# Patient Record
Sex: FEMALE
[De-identification: names, ages, dates, MRNs, and addresses within clinical notes are randomized per-mention and may not be internally consistent; named-entity substitution may affect disease eponyms.]

---

## 2017-06-02 ENCOUNTER — HOSPITAL ENCOUNTER (EMERGENCY)
Dept: HOSPITAL 31 - C.ER | Age: 77
Discharge: LEFT BEFORE DISCHARGE | End: 2017-06-02
Payer: COMMERCIAL

## 2017-06-02 VITALS
RESPIRATION RATE: 14 BRPM | OXYGEN SATURATION: 99 % | TEMPERATURE: 97.8 F | HEART RATE: 64 BPM | DIASTOLIC BLOOD PRESSURE: 64 MMHG | SYSTOLIC BLOOD PRESSURE: 144 MMHG

## 2017-06-02 DIAGNOSIS — R07.89: ICD-10-CM

## 2017-06-02 DIAGNOSIS — R42: Primary | ICD-10-CM

## 2017-06-02 LAB
ALBUMIN/GLOB SERPL: 1.5 {RATIO}
ALP SERPL-CCNC: 55 U/L
ALT SERPL-CCNC: 24 U/L
APTT BLD: 39 SECONDS
AST SERPL-CCNC: 23 U/L
BASOPHILS # BLD AUTO: 0 K/UL
BASOPHILS NFR BLD: 0.6 %
BILIRUB SERPL-MCNC: 0.5 MG/DL
BILIRUB UR-MCNC: NEGATIVE MG/DL
BUN SERPL-MCNC: 24 MG/DL
CALCIUM SERPL-MCNC: 8.8 MG/DL
CHLORIDE SERPL-SCNC: 101 MMOL/L
CHOLEST SERPL-MCNC: 207 MG/DL
CO2 SERPL-SCNC: 30 MMOL/L
EOSINOPHIL # BLD AUTO: 0.2 K/UL
EOSINOPHIL NFR BLD: 3.5 %
ERYTHROCYTE [DISTWIDTH] IN BLOOD BY AUTOMATED COUNT: 14.8 %
GLOBULIN SER-MCNC: 2.8 GM/DL
GLUCOSE SERPL-MCNC: 92 MG/DL
GLUCOSE UR STRIP-MCNC: NORMAL MG/DL
HCT VFR BLD CALC: 42 %
INR PPP: 1.2
KETONES UR STRIP-MCNC: NEGATIVE MG/DL
LEUKOCYTE ESTERASE UR-ACNC: (no result) LEU/UL
LYMPHOCYTES # BLD AUTO: 2.2 K/UL
LYMPHOCYTES NFR BLD AUTO: 32.1 %
MCH RBC QN AUTO: 30.2 PG
MCHC RBC AUTO-ENTMCNC: 33.1 G/DL
MCV RBC AUTO: 91.1 FL
MONOCYTES # BLD: 0.8 K/UL
MONOCYTES NFR BLD: 12.1 %
NRBC BLD AUTO-RTO: 0.1 %
PH UR STRIP: 5 [PH]
PLATELET # BLD: 263 K/UL
PMV BLD AUTO: 8.4 FL
POTASSIUM SERPL-SCNC: 4.4 MMOL/L
PROT SERPL-MCNC: 7 G/DL
PROT UR STRIP-MCNC: NEGATIVE MG/DL
RBC # UR STRIP: NEGATIVE /UL
RBC #/AREA URNS HPF: < 1 /HPF
SODIUM SERPL-SCNC: 139 MMOL/L
SP GR UR STRIP: 1.02
UROBILINOGEN UR-MCNC: NORMAL MG/DL
WBC # BLD AUTO: 6.7 K/UL
WBC #/AREA URNS HPF: 2 /HPF

## 2017-06-02 PROCEDURE — 71010: CPT

## 2017-06-02 PROCEDURE — 80061 LIPID PANEL: CPT

## 2017-06-02 PROCEDURE — 85730 THROMBOPLASTIN TIME PARTIAL: CPT

## 2017-06-02 PROCEDURE — 83036 HEMOGLOBIN GLYCOSYLATED A1C: CPT

## 2017-06-02 PROCEDURE — 85610 PROTHROMBIN TIME: CPT

## 2017-06-02 PROCEDURE — 82948 REAGENT STRIP/BLOOD GLUCOSE: CPT

## 2017-06-02 PROCEDURE — 81001 URINALYSIS AUTO W/SCOPE: CPT

## 2017-06-02 PROCEDURE — 80053 COMPREHEN METABOLIC PANEL: CPT

## 2017-06-02 PROCEDURE — 96374 THER/PROPH/DIAG INJ IV PUSH: CPT

## 2017-06-02 PROCEDURE — 70450 CT HEAD/BRAIN W/O DYE: CPT

## 2017-06-02 PROCEDURE — 99285 EMERGENCY DEPT VISIT HI MDM: CPT

## 2017-06-02 PROCEDURE — 85025 COMPLETE CBC W/AUTO DIFF WBC: CPT

## 2017-06-02 PROCEDURE — 93005 ELECTROCARDIOGRAM TRACING: CPT

## 2017-06-02 PROCEDURE — 84484 ASSAY OF TROPONIN QUANT: CPT

## 2017-06-02 PROCEDURE — 96375 TX/PRO/DX INJ NEW DRUG ADDON: CPT

## 2017-06-02 NOTE — C.PDOC
Time Seen by Provider: 06/02/17 17:07


Chief Complaint (Nursing): Dizziness/Lightheaded





Past Medical History


Vital Signs: 





 Last Vital Signs











Temp  98.6 F   06/02/17 17:05


 


Pulse  70   06/02/17 17:05


 


Resp  20   06/02/17 17:05


 


BP  138/69   06/02/17 17:05


 


Pulse Ox  96   06/02/17 17:05














- Medical History


PMH: Atrial Fibrillation, Cardia Arrhythmia (unknown tachycardia), HTN, 

Pneumonia


Family History: States: Unknown Family Hx





- Social History


Hx Alcohol Use: No


Hx Substance Use: No





ED Course And Treatment





- Laboratory Results


Result Diagrams: 


 06/02/17 17:42





 06/02/17 17:42


O2 Sat by Pulse Oximetry: 96

## 2017-06-02 NOTE — CT
PROCEDURE:  CT HEAD WITHOUT CONTRAST.



HISTORY:

dizzy x 5 days



COMPARISON:

None available. 



TECHNIQUE:

Axial computed tomography images were obtained through the head/brain 

without intravenous contrast.  



Radiation dose:



Total exam DLP = 1703.67 mGy-cm.



This CT exam was performed using one or more of the following dose 

reduction techniques: Automated exposure control, adjustment of the 

mA and/or kV according to patient size, and/or use of iterative 

reconstruction technique.



FINDINGS:



HEMORRHAGE:

No intracranial hemorrhage. 



BRAIN:

No mass effect or edema.  Mild atrophy and mild-to-moderate white 

matter changes.



VENTRICLES:

Unremarkable. No hydrocephalus. 



CALVARIUM:

Unremarkable.



PARANASAL SINUSES:

Unremarkable as visualized. No significant inflammatory changes.



MASTOID AIR CELLS:

Unremarkable as visualized. No inflammatory changes.



OTHER FINDINGS:

None.



IMPRESSION:

Limited study due to patient's motion.  No evidence of acute 

intracranial hemorrhage.



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

## 2017-06-02 NOTE — CP.PCM.HP
History of Present Illness





- History of Present Illness


History of Present Illness: 





CC - "Headache x 5 days"





HPI - 76 year old female with a past medical history Afib, cataracts, 

osteoporosis, hypertension present today complaining of dizziness and headache 

for 5 days. She states this is on and off. She denies acute visual changes but 

states she has bad vision due to cataracts and was evaluation by an 

ophthalmologist recently. She said she feeling tired and admits to shortness of 

breath with exertion such as walking a few blocks or going up stairs. She 

denies swelling in the legs. Per daughter she has had a nonproductive cough 

today. She admits to generalized weakness. 





She denies recent illlness, sick contacts, fever, chills, chest pain, 

palpitations, shortness of breath, abd pain, vomiting, diarrhea/constipation, 

numbness or tingling. 








PMHx - Afib, cataracts, osteoporosis, hypertension


Surg - denies


Fam Hx - sister has vaginal cancer, ho hx of stroke, heart disease, MI, DM


Meds - Xarelto 20mg PO daily. Metorpolol 25 mg PO daily, Bisphophenate weekly 

on Fridays


Allergies - NKDA


Social - denies tobacco, alcohol, or drug use. From the [place]. 

Lives here with her daughter for about 6 months out of the year





PMD - none but has been seen at [facility] about 3 months ago


Cardiologist - none but has been evaluated before in the past and can't 

remember the name





Review of Systems





- Constitutional


Constitutional: absent: Chills, Fever





- EENT


Eyes: absent: Blurred Vision, Change in Vision (cataracts)





- Cardiovascular


Cardiovascular: Lightheadedness, Orthopnea.  absent: Chest Pain, Chest Pain at 

Rest, Leg Edema, Palpitations, Syncope





- Respiratory


Respiratory: Cough (nonproductive), Dyspnea on Exertion.  absent: Dyspnea





- Gastrointestinal


Gastrointestinal: absent: Abdominal Pain, Constipation, Diarrhea, Nausea, 

Vomiting





- Genitourinary


Genitourinary: absent: Change in Urinary Stream, Difficulty Urinating





- Musculoskeletal


Musculoskeletal: absent: Numbness, Tingling


Additional comments: 





R knee pain, chronic pains from arthritis





- Neurological


Neurological: Dizziness, Headaches.  absent: Numbness, Syncope, Tingling, 

Weakness





Past Patient History





- Infectious Disease


Hx of Infectious Diseases: None





- Past Social History


Smoking Status: Never Smoked





- CARDIAC


Hx Atrial Fibrillation: Yes


Hx Cardia Arrhythmia: Yes (unknown tachycardia)


Hx Hypertension: Yes





- PULMONARY


Hx Pneumonia: Yes





- PSYCHIATRIC


Hx Substance Use: No





- SURGICAL HISTORY


Hx Surgeries: No





Meds


Allergies/Adverse Reactions: 


 Allergies











Allergy/AdvReac Type Severity Reaction Status Date / Time


 


No Known Allergies Allergy   Verified 06/02/17 17:25














Results





- Vital Signs


Recent Vital Signs: 





 Last Vital Signs











Temp  98.6 F   06/02/17 17:05


 


Pulse  70   06/02/17 17:05


 


Resp  20   06/02/17 17:05


 


BP  138/69   06/02/17 17:05


 


Pulse Ox  96   06/02/17 19:43














- Labs


Result Diagrams: 


 06/02/17 17:42





 06/02/17 17:42





Assessment & Plan





- Assessment and Plan (Free Text)


Assessment: 





76 year old female with a past medical history of Afib, HTN, osteoporosis, and 

arthritis presents for dizziness and HA for 5 days:


Plan: 








Atrial fibrillation


Denies palpitations or chest pain on examination but per ED complained of chest 

discomfort


NSR on Monitor


Troponin negative, will follow up with serial troponins and EKG


EKG - NSR at 66 bpm


HbA1c 5.9


Tchol 207, Trig 123, , HDL 37


f/u Echo


f/u orthostatic vitals


Xarelto 20mg PO - takes in the mornings


Admit to telemetry


f/u am labs, TSH Free T4





Hypertension


controlled 


monitor 


Home med: Metoprolol 25 mg PO daily


Will hold as patient is dizzy and HR is in the low 60s





Osteoporosis


Tylenol prn pain


Takes bisphosphenate once weekly on Friday mornings - took dose today 





Prophylactic measures


GI - not indicated


DVT - on Xarelto daily


Heart healthy diet

## 2017-06-02 NOTE — C.PDOC
History Of Present Illness





77 y/o female presents to the emergency department with complains of dizziness 

for the past 5 days, worse with climbing stairs. Pt denies headache, chest pain

, fever, vomiting, SOB or any other complaints. 


Time Seen by Provider: 17 17:07


Chief Complaint (Nursing): Dizziness/Lightheaded


History Per: Patient


History/Exam Limitations: no limitations


Onset/Duration Of Symptoms: Days


Current Symptoms Are (Timing): Still Present


Fall Associated With With Symptoms: No


Severity: Mild


Recent travel outside of the [place]: No





- Symptoms Of CVA


Recent Head Trauma: No





Past Medical History


Reviewed: Historical Data, Nursing Documentation, Vital Signs


Vital Signs: 


 Last Vital Signs











Temp  98.6 F   17 17:05


 


Pulse  62   17 20:49


 


Resp  15   17 20:49


 


BP  146/54 L  17 20:49


 


Pulse Ox  96   17 21:58














- Medical History


PMH: Atrial Fibrillation, Cardia Arrhythmia (unknown tachycardia), HTN, 

Pneumonia


Family History: States: Unknown Family Hx





- Social History


Hx Alcohol Use: No


Hx Substance Use: No





Review Of Systems


Except As Marked, All Systems Reviewed And Found Negative.


Constitutional: Negative for: Fever, Chills


Cardiovascular: Negative for: Chest Pain


Respiratory: Negative for: Shortness of Breath


Gastrointestinal: Negative for: Vomiting


Neurological: Positive for: Dizziness.  Negative for: Headache





Physical Exam





- Physical Exam


Appears: Non-toxic, No Acute Distress, Other (obese)


Skin: Warm, Dry, No Rash


Head: Atraumatic, Normacephalic


Eye(s): bilateral: Normal Inspection, PERRL, EOMI


Neck: Normal, Normal ROM, Supple


Chest: Symmetrical, No Tenderness


Cardiovascular: Rhythm Regular, No Murmur


Respiratory: Normal Breath Sounds, No Rales, No Rhonchi, No Wheezing


Gastrointestinal/Abdominal: Normal Exam, Soft, No Tenderness


Extremity: Normal ROM


Extremity: Bilateral: Atraumatic


Neurological/Psych: Oriented x3, Normal Speech, Normal Cognition





ED Course And Treatment





- Laboratory Results


Result Diagrams: 


 17 17:42





 17 17:42


Lab Interpretation: Normal (trop neg.)


ECG: Interpreted By Me


ECG Rhythm: Sinus Rhythm


ECG Interpretation: Normal


Rate From EC


O2 Sat by Pulse Oximetry: 96 (room air)


Pulse Ox Interpretation: Normal





- Radiology


CXR: Interpreted by Me


CXR Interpretation: Yes: No Acute Disease, Other (? mild CHF vs body habitus.)





- CT Scan/US


  ** CT head


Other Rad Studies (CT/US): Read By Radiologist, Radiology Report Reviewed


CT/US Interpretation: IMPRESSION:  Limited study due to patient's motion.  No 

evidence of acute intracranial hemorrhage.  Atrophy and chronic microvascular 

white matter ischemic disease.


Progress Note: Plan: EKG, CT head, labs, CXR, meclizine, pepcid, zofran, BGL


Reevaluation Time: 19:27


Reassessment Condition: Improved





- Physician Consult Information


Outcome Of Conversation: 193: d/w [facility] Dr. Katz ok to Tele obs.





Medical Decision Making


Medical Decision Making: 





dizziness, sob, CARRIZALES may be cardiac or neuro in nature.


w/u neg to this point.


consider Cardiac and Neuro evals.





: evaled by Medicine- defer adm now and opt f/u to be arranged.





Disposition


Doctor Will See Patient In The: Hospital


Counseled Patient/Family Regarding: Studies Performed, Diagnosis





- Disposition


Disposition: HOME/ ROUTINE


Disposition Time: 19:28


Condition: GOOD





- Clinical Impression


Clinical Impression: 


 Chest discomfort, Dizzinesses








- Scribe Statement


The provider has reviewed the documentation as recorded by the Pooja Cruz


Provider Attestation: 








All medical record entries made by the Pooja were at my direction and 

personally dictated by me. I have reviewed the chart and agree that the record 

accurately reflects my personal performance of the history, physical exam, 

medical decision making, and the department course for this patient. I have 

also personally directed, reviewed, and agree with the discharge instructions 

and disposition.

## 2017-06-03 NOTE — RAD
HISTORY:

dizzy  



COMPARISON:

No prior. 



FINDINGS:



LUNGS:

Poor inspiration with low lung volumes and crowded bronchovascular 

markings and minor bibasilar atelectasis right greater than left.  

Right lower lobe infiltrate not excluded therefore followup 

radiographs recommended 



PLEURA:

No significant pleural effusion identified, no pneumothorax apparent.



CARDIOVASCULAR:

Heart is borderline/mildly enlarged 



OSSEOUS STRUCTURES:

No significant abnormalities.



VISUALIZED UPPER ABDOMEN:

Normal.



OTHER FINDINGS:

None.



IMPRESSION:

Poor inspiration with low lung volumes and crowded bronchovascular 

markings and minor bibasilar atelectasis right greater than left.  

Right lower lobe infiltrate could be excluded with followup 

radiographs. . 



This report was placed folder in PA review for followup.

## 2017-06-05 NOTE — CARD
--------------- APPROVED REPORT --------------





EKG Measurement

Heart [id number]

NM [id number]

[id number]

[id number]

[id number]



<Conclusion>

Normal sinus rhythm

Normal ECG

## 2017-06-07 ENCOUNTER — HOSPITAL ENCOUNTER (EMERGENCY)
Dept: HOSPITAL 31 - C.ER | Age: 77
Discharge: HOME | End: 2017-06-07
Payer: COMMERCIAL

## 2017-06-07 VITALS
OXYGEN SATURATION: 100 % | RESPIRATION RATE: 18 BRPM | SYSTOLIC BLOOD PRESSURE: 120 MMHG | HEART RATE: 74 BPM | TEMPERATURE: 98.2 F | DIASTOLIC BLOOD PRESSURE: 76 MMHG

## 2017-06-07 DIAGNOSIS — Z00.00: Primary | ICD-10-CM

## 2017-06-07 NOTE — C.PDOC
History Of Present Illness





77 y/o female presents to ED who was seen and evaluated in the ER last Friday (

5 days ago) for SOB, dizziness, lightheadedness, nausea - full workup including 

CXR, discharged home with viral syndrome dx. Since then, patient reports 

symptoms have resolved, and that she feels much better. However, patient 

reports she got a phone call today informing her that the chest x-ray could not 

rule out RLL infiltrate. She denies any physical complaints on arrival; denies 

cough, congestion, fever, chills, or other associated symptoms. 


Time Seen by Provider: 06/07/17 15:15


Chief Complaint (Nursing): Abnormal Labs


History Per: Patient


History/Exam Limitations: no limitations


Current Symptoms Are (Timing): Gone


Reports Recently: Seen In ED


Recent travel outside of the [place]: No





Past Medical History


Reviewed: Historical Data, Nursing Documentation, Vital Signs


Vital Signs: 


 Last Vital Signs











Temp  98.2 F   06/07/17 14:58


 


Pulse  74   06/07/17 14:58


 


Resp  18   06/07/17 14:58


 


BP  120/76   06/07/17 14:58


 


Pulse Ox  100   06/07/17 15:26














- Medical History


PMH: Atrial Fibrillation, Cardia Arrhythmia (unknown tachycardia), HTN, 

Pneumonia


Family History: States: Unknown Family Hx





- Social History


Hx Alcohol Use: No


Hx Substance Use: No





Review Of Systems


Constitutional: Negative for: Fever, Chills


ENT: Negative for: Nose Congestion


Cardiovascular: Negative for: Chest Pain


Respiratory: Negative for: Cough, Shortness of Breath, Wheezing


Gastrointestinal: Negative for: Nausea, Vomiting, Abdominal Pain


Skin: Negative for: Rash


Neurological: Negative for: Headache, Dizziness





Physical Exam





- Physical Exam


Appears: Non-toxic, No Acute Distress


Skin: Normal Color, Warm, Dry


Head: Atraumatic, Normacephalic


Oral Mucosa: Moist


Chest: Symmetrical


Cardiovascular: Rhythm Regular


Respiratory: Normal Breath Sounds, No Rales, No Rhonchi, No Wheezing


Gastrointestinal/Abdominal: Soft, No Tenderness, No Guarding, No Rebound


Back: Normal Inspection


Extremity: Normal ROM, Capillary Refill (< 2 sec. )


Neurological/Psych: Oriented x3, Normal Speech, Normal Cognition





ED Course And Treatment


O2 Sat by Pulse Oximetry: 100 (RA)


Pulse Ox Interpretation: Normal





- Radiology


CXR: Viewed By Me, Read By Radiologist


CXR Interpretation: Yes: No Acute Disease





Disposition





- Disposition


Disposition: HOME/ ROUTINE


Disposition Time: 16:01


Condition: IMPROVED


Instructions:  Normal Exam (ED)





- Clinical Impression


Clinical Impression: 


 Normal exam








- Scribe Statement


The provider has reviewed the documentation as recorded by the Pooja Samayoa


Provider Attestation: 








All medical record entries made by the Pooja were at my direction and 

personally dictated by me. I have reviewed the chart and agree that the record 

accurately reflects my personal performance of the history, physical exam, 

medical decision making, and the department course for this patient. I have 

also personally directed, reviewed, and agree with the discharge instructions 

and disposition.

## 2017-06-07 NOTE — RAD
HISTORY:

follow up CXR r/o RLL infiltrate  



COMPARISON:

6/2/2017



TECHNIQUE:

Chest PA and lateral



FINDINGS:



LUNGS:

No active pulmonary disease.



PLEURA:

No significant pleural effusion identified. No pneumothorax apparent.



CARDIOVASCULAR:

Normal.



OSSEOUS STRUCTURES:

No significant abnormalities.



VISUALIZED UPPER ABDOMEN:

Normal.



OTHER FINDINGS:

None.



IMPRESSION:

No active disease.

## 2017-07-02 ENCOUNTER — HOSPITAL ENCOUNTER (OUTPATIENT)
Dept: HOSPITAL 31 - C.ER | Age: 77
Setting detail: OBSERVATION
LOS: 1 days | Discharge: HOME | End: 2017-07-03
Attending: INTERNAL MEDICINE | Admitting: INTERNAL MEDICINE
Payer: SELF-PAY

## 2017-07-02 VITALS — HEART RATE: 116 BPM

## 2017-07-02 VITALS — BODY MASS INDEX: 33.3 KG/M2

## 2017-07-02 DIAGNOSIS — I48.0: Primary | ICD-10-CM

## 2017-07-02 DIAGNOSIS — Z87.01: ICD-10-CM

## 2017-07-02 DIAGNOSIS — I10: ICD-10-CM

## 2017-07-02 LAB
ALBUMIN/GLOB SERPL: 1.1 {RATIO} (ref 1–2.1)
ALP SERPL-CCNC: 54 U/L (ref 38–126)
ALT SERPL-CCNC: 26 U/L (ref 9–52)
APTT BLD: 43 SECONDS (ref 21–34)
AST SERPL-CCNC: 27 U/L (ref 14–36)
BASOPHILS # BLD AUTO: 0 K/UL (ref 0–0.2)
BASOPHILS NFR BLD: 0.5 % (ref 0–2)
BILIRUB SERPL-MCNC: 0.4 MG/DL (ref 0.2–1.3)
BILIRUB UR-MCNC: NEGATIVE MG/DL
BUN SERPL-MCNC: 23 MG/DL (ref 7–17)
CALCIUM SERPL-MCNC: 9.8 MG/DL (ref 8.6–10.4)
CHLORIDE SERPL-SCNC: 106 MMOL/L (ref 98–107)
CHOLEST SERPL-MCNC: 151 MG/DL (ref 0–199)
CHOLEST SERPL-MCNC: 182 MG/DL (ref 0–199)
CO2 SERPL-SCNC: 30 MMOL/L (ref 22–30)
EOSINOPHIL # BLD AUTO: 0.2 K/UL (ref 0–0.7)
EOSINOPHIL NFR BLD: 2.3 % (ref 0–4)
ERYTHROCYTE [DISTWIDTH] IN BLOOD BY AUTOMATED COUNT: 14.6 % (ref 11.5–14.5)
GLOBULIN SER-MCNC: 3.5 GM/DL (ref 2.2–3.9)
GLUCOSE SERPL-MCNC: 116 MG/DL (ref 65–105)
GLUCOSE UR STRIP-MCNC: NORMAL MG/DL
HCT VFR BLD CALC: 45.7 % (ref 34–47)
INR PPP: 1.5
KETONES UR STRIP-MCNC: NEGATIVE MG/DL
LEUKOCYTE ESTERASE UR-ACNC: (no result) LEU/UL
LYMPHOCYTES # BLD AUTO: 2.1 K/UL (ref 1–4.3)
LYMPHOCYTES NFR BLD AUTO: 26.5 % (ref 20–40)
MCH RBC QN AUTO: 30.2 PG (ref 27–31)
MCHC RBC AUTO-ENTMCNC: 32.7 G/DL (ref 33–37)
MCV RBC AUTO: 92.1 FL (ref 81–99)
MONOCYTES # BLD: 0.9 K/UL (ref 0–0.8)
MONOCYTES NFR BLD: 10.9 % (ref 0–10)
NRBC BLD AUTO-RTO: 0 % (ref 0–2)
PH UR STRIP: 5 [PH] (ref 5–8)
PLATELET # BLD: 286 K/UL (ref 130–400)
PMV BLD AUTO: 8.8 FL (ref 7.2–11.7)
POTASSIUM SERPL-SCNC: 4.4 MMOL/L (ref 3.6–5.2)
PROT SERPL-MCNC: 7.4 G/DL (ref 6.3–8.3)
PROT UR STRIP-MCNC: NEGATIVE MG/DL
RBC # UR STRIP: NEGATIVE /UL
RBC #/AREA URNS HPF: 2 /HPF (ref 0–3)
SODIUM SERPL-SCNC: 147 MMOL/L (ref 132–148)
SP GR UR STRIP: 1.02 (ref 1–1.03)
T3RU NFR SERPL: 36 % (ref 23–41)
T4 SERPL-MCNC: 7.7 UG/DL (ref 5.5–11)
TSH SERPL-ACNC: 0.61 MIU/L (ref 0.46–4.68)
UROBILINOGEN UR-MCNC: NORMAL MG/DL (ref 0.2–1)
WBC # BLD AUTO: 8 K/UL (ref 4.8–10.8)
WBC #/AREA URNS HPF: 5 /HPF (ref 0–5)

## 2017-07-02 PROCEDURE — 96374 THER/PROPH/DIAG INJ IV PUSH: CPT

## 2017-07-02 PROCEDURE — 85025 COMPLETE CBC W/AUTO DIFF WBC: CPT

## 2017-07-02 PROCEDURE — 85730 THROMBOPLASTIN TIME PARTIAL: CPT

## 2017-07-02 PROCEDURE — 97162 PT EVAL MOD COMPLEX 30 MIN: CPT

## 2017-07-02 PROCEDURE — 93005 ELECTROCARDIOGRAM TRACING: CPT

## 2017-07-02 PROCEDURE — 71010: CPT

## 2017-07-02 PROCEDURE — 84479 ASSAY OF THYROID (T3 OR T4): CPT

## 2017-07-02 PROCEDURE — 97116 GAIT TRAINING THERAPY: CPT

## 2017-07-02 PROCEDURE — 99285 EMERGENCY DEPT VISIT HI MDM: CPT

## 2017-07-02 PROCEDURE — 80053 COMPREHEN METABOLIC PANEL: CPT

## 2017-07-02 PROCEDURE — 84443 ASSAY THYROID STIM HORMONE: CPT

## 2017-07-02 PROCEDURE — 97165 OT EVAL LOW COMPLEX 30 MIN: CPT

## 2017-07-02 PROCEDURE — 81001 URINALYSIS AUTO W/SCOPE: CPT

## 2017-07-02 PROCEDURE — 85610 PROTHROMBIN TIME: CPT

## 2017-07-02 PROCEDURE — 80061 LIPID PANEL: CPT

## 2017-07-02 PROCEDURE — 84484 ASSAY OF TROPONIN QUANT: CPT

## 2017-07-02 PROCEDURE — 96375 TX/PRO/DX INJ NEW DRUG ADDON: CPT

## 2017-07-02 PROCEDURE — 97530 THERAPEUTIC ACTIVITIES: CPT

## 2017-07-02 PROCEDURE — 84436 ASSAY OF TOTAL THYROXINE: CPT

## 2017-07-02 PROCEDURE — 83880 ASSAY OF NATRIURETIC PEPTIDE: CPT

## 2017-07-02 NOTE — RAD
HISTORY:

atrial fib  



COMPARISON:

06/07/2017 



FINDINGS:



LUNGS:

There is mild pulmonary venous congestion. No focal consolidation



PLEURA:

No significant pleural effusion identified, no pneumothorax apparent.



CARDIOVASCULAR:

There is persistent mild cardio



OSSEOUS STRUCTURES:

No significant abnormalities.



VISUALIZED UPPER ABDOMEN:

Normal.



OTHER FINDINGS:

None.



IMPRESSION:

No acute findings.  Mild pulmonary venous congestion and mild 

cardiomegaly.

## 2017-07-02 NOTE — C.PDOC
History Of Present Illness


Patient is a 77 y/o female, whose PMHx includes Afib, that presents to the ED 

for evaluation of palpitations since last night. Patient states that she woke 

up this morning feeling weak, and lightheaded. Patient also reports shortness 

of breath with excertion, but denies any chest pain. Patient states she is 

compliant with her Xarelto, and Metoprolol 25mg once a day. Otherwise, denies 

any fever, chills, cough, swelling, abdominal pain, nausea, vomiting, or any 

other associated symptoms at this time.





Time Seen by Provider: 07/02/17 12:15


Chief Complaint (Nursing): Palpitations


History Per: Patient


History/Exam Limitations: no limitations


Onset/Duration Of Symptoms: Days (1)


Current Symptoms Are (Timing): Still Present


Associated Symptoms: denies: Chest Pain, Blurred Vision, Focal Weakness, 

Headache


Recent travel outside of the [place]: No


Additional History Per: Patient





Past Medical History


Reviewed: Historical Data, Nursing Documentation, Vital Signs


Vital Signs: 


 Last Vital Signs











Temp  98.2 F   07/02/17 12:19


 


Pulse  118 H  07/02/17 12:55


 


Resp  12   07/02/17 12:55


 


BP  115/58 L  07/02/17 12:55


 


Pulse Ox  97   07/02/17 13:13














- Medical History


PMH: Atrial Fibrillation, Cardia Arrhythmia (unknown tachycardia), HTN, 

Pneumonia


Family History: States: Unknown Family Hx





- Social History


Hx Alcohol Use: No


Hx Substance Use: No





- Immunization History


Hx Tetanus Toxoid Vaccination: No


Hx Influenza Vaccination: No


Hx Pneumococcal Vaccination: No





Review Of Systems


Except As Marked, All Systems Reviewed And Found Negative.


Constitutional: Positive for: Weakness.  Negative for: Fever, Chills


Cardiovascular: Positive for: Palpitations, Light Headedness.  Negative for: 

Chest Pain


Respiratory: Positive for: SOB with Excertion.  Negative for: Cough


Gastrointestinal: Negative for: Nausea, Vomiting, Abdominal Pain


Neurological: Negative for: Headache, Dizziness





Physical Exam





- Physical Exam


Appears: Non-toxic, No Acute Distress


Skin: Normal Color, Warm, Dry


Head: Atraumatic, Normacephalic


Eye(s): bilateral: Normal Inspection, EOMI


Neck: Normal ROM, Supple


Chest: Symmetrical, No Tenderness


Cardiovascular: Rhythm Irregular (irregularly irregular)


Respiratory: Normal Breath Sounds, No Rales, No Rhonchi, No Wheezing


Gastrointestinal/Abdominal: Soft, No Tenderness, Other (obese abdomen)


Extremity: Normal ROM, No Pedal Edema, Capillary Refill (<2 sec.), No Deformity

, No Swelling


Neurological/Psych: Oriented x3, Normal Speech, Normal Cognition





ED Course And Treatment





- Laboratory Results


Result Diagrams: 


 07/02/17 12:30





 07/02/17 12:30


Lab Interpretation: Abnormal


Interpretation Of Abnormal: BUN 23, BNP 2650, Troponnin normal, TSH normal.


ECG: Interpreted By Me


ECG Rhythm: Atrial Fibrillation (with rapid ventricular responseQ wave V1 c/w 

possible old anterior infarct.)


O2 Sat by Pulse Oximetry: 97 (on RA)


Pulse Ox Interpretation: Normal





- Radiology


CXR: Interpreted by Me


CXR Interpretation: Yes: Cardiomegaly (with vascular congestion)


Progress Note: Labs, EKG ordered and reviewed. Patient was given IV Cardizem in 

the ER.


Reevaluation Time: 13:11


Reassessment Condition: Improved (Heart rate decresed after Cardizem 10mg IVP. 

BP initially decresed to 86/50 but improved with normal saline bolus.)





- Physician Consult Information


Time Consulting Physician Contacted: 13:12


Physician Contacted: Vaibhav Edwards


Outcome Of Conversation: Patient to be admitted for rapid atrial fibrillation.





Disposition





- Disposition


Disposition: HOSPITALIZED


Disposition Time: 13:12


Condition: IMPROVED





- POA


Present On Arrival: None





- Clinical Impression


Clinical Impression: 


 Paroxysmal atrial fibrillation with rapid ventricular response








- Scribe Statement


The provider has reviewed the documentation as recorded by the Pooja Tena





Provider Attestation: 








All medical record entries made by the Mohsenibava were at my direction and 

personally dictated by me. I have reviewed the chart and agree that the record 

accurately reflects my personal performance of the history, physical exam, 

medical decision making, and the department course for this patient. I have 

also personally directed, reviewed, and agree with the discharge instructions 

and disposition.

## 2017-07-02 NOTE — CP.PCM.HP
History of Present Illness





- History of Present Illness


History of Present Illness: 





CC: Dizziness, Light headedness, SOB with exertion





Patient is a 77 y/o female, whose PMHx includes Afib, that presents to the ED 

for evaluation of palpitations since last night. Patient states that she woke 

up this morning feeling weak, and lightheaded. Patient also reports shortness 

of breath with excertion, but denies any chest pain. Patient states she is 

compliant with her Xarelto, and Metoprolol 25mg once a day. Otherwise, denies 

any fever, chills, cough, swelling, abdominal pain, nausea, vomiting, or any 

other associated symptoms at this time.pt was in rapid A.fib when i saw in ER , 

hypotensive, was given fluid blous, initially was given cardizem then digoxin 

and pt responded





Present on Admission





- Present on Admission


Any Indicators Present on Admission: Yes


History of DVT/PE: No


History of Uncontrolled Diabetes: No





Review of Systems





- Review of Systems


Systems not reviewed;Unavailable: Acuity of Condition





- Constitutional


Constitutional: Fatigue, Weakness





- EENT


Eyes: absent: As Per HPI, Blind Spots, Blurred Vision, Change in Vision, 

Decreased Night Vision, Diplopia, Discharge, Dry Eye, Exophthalmos, Floaters, 

Irritation, Itchy Eyes, Loss of Peripheral Vision, Pain, Photophobia, Requires 

Corrective Lenses, Sees Flashes, Spots in Vision, Tunnel Vision, Other Visual 

Disturbances, Loss of Vision, Other





- Cardiovascular


Cardiovascular: Dyspnea, Dyspnea on Exertion, Lightheadedness





- Respiratory


Respiratory: absent: As Per HPI, Cough, Dyspnea, Hemoptysis, Dyspnea on Exertion

, Wheezing, Snoring, Stridor, Pain on Inspiration, Chest Congestion, Excessive 

Mucous Production, Change in Mucous Color, Pain with Coughing, Other





- Gastrointestinal


Gastrointestinal: absent: As Per HPI, Abdominal Pain, Belching, Bloating, 

Change in Bowel Habits, Change in Stool Character, Coffee Ground Emesis, 

Constipation, Cramping, Diarrhea, Dyspepsia, Dysphagia, Early Satiety, 

Excessive Flatus, Fecal Incontinence, Heartburn, Hematemesis, Hematochezia, 

Loose Stools, Melena, Nausea, Odynophagia, Temesmus, Vomiting, Other





- Genitourinary


Genitourinary: absent: As Per HPI, Change in Urinary Stream, Difficulty 

Urinating, Dysuria, Flank Pain, Hematuria, Pyuria, Nocturia, Urinary 

Incontinence, Urinary Frequency, Urinary Hesitance, Urinary Urgency, Voiding 

Freq/Small Amts, Freq UTI, Hx Renal/Bladder Calculi, Hx /Renal Surgery, 

Bladder Distension, Other





Past Patient History





- Infectious Disease


Hx of Infectious Diseases: None





- Past Medical History & Family History


Past Medical History?: Yes





- Past Social History


Smoking Status: Never Smoked





- CARDIAC


Hx Cardiac Disorders: Yes


Hx Atrial Fibrillation: Yes


Hx Cardia Arrhythmia: Yes (unknown tachycardia)


Hx Hypertension: Yes





- PULMONARY


Hx Respiratory Disorders: Yes


Hx Pneumonia: Yes





- NEUROLOGICAL


Hx Neurological Disorder: No





- HEENT


Hx HEENT Problems: No





- RENAL


Hx Chronic Kidney Disease: No





- ENDOCRINE/METABOLIC


Hx Endocrine Disorders: No





- HEMATOLOGICAL/ONCOLOGICAL


Hx Blood Disorders: No





- INTEGUMENTARY


Hx Dermatological Problems: No





- MUSCULOSKELETAL/RHEUMATOLOGICAL


Hx Musculoskeletal Disorders: No





- GASTROINTESTINAL


Hx Gastrointestinal Disorders: No





- GENITOURINARY/GYNECOLOGICAL


Hx Genitourinary Disorders: No





- PSYCHIATRIC


Hx Psychophysiologic Disorder: No


Hx Substance Use: No





- SURGICAL HISTORY


Hx Surgeries: No





- ANESTHESIA


Hx Anesthesia: No





Meds


Home Medications: 


 Home Medication List











 Medication  Instructions  Recorded  Confirmed  Type


 


Metoprolol Tartrate [Lopressor] 50 mg PO BID #60 tab 07/03/17  Rx


 


diltiaZEM CD [Cardizem CD] 120 mg PO DAILY #30 c24 07/03/17  Rx











Allergies/Adverse Reactions: 


 Allergies











Allergy/AdvReac Type Severity Reaction Status Date / Time


 


No Known Allergies Allergy   Verified 07/02/17 12:15














Physical Exam





- Constitutional


Appears: No Acute Distress





- Head Exam


Head Exam: ATRAUMATIC, NORMAL INSPECTION, NORMOCEPHALIC





- Eye Exam


Eye Exam: EOMI, Normal appearance, PERRL


Pupil Exam: NORMAL ACCOMODATION, PERRL





- Respiratory Exam


Respiratory Exam: Clear to Auscultation Bilateral, NORMAL BREATHING PATTERN





- Cardiovascular Exam


Cardiovascular Exam: Irregular Rhythm, +S1, +S2





- GI/Abdominal Exam


GI & Abdominal Exam: Normal Bowel Sounds, Soft.  absent: Tenderness





- Rectal Exam


Rectal Exam: NORMAL INSPECTION





- Neurological Exam


Neurological exam: Alert, CN II-XII Intact, Normal Gait, Oriented x3, Reflexes 

Normal





Results





- Vital Signs


Recent Vital Signs: 





 Last Vital Signs











Temp  97.3 F L  07/02/17 17:19


 


Pulse  96 H  07/02/17 22:00


 


Resp  16   07/02/17 18:02


 


BP  127/74   07/02/17 22:00


 


Pulse Ox  95   07/02/17 17:19














- Labs


Result Diagrams: 


 07/02/17 12:30





 07/02/17 12:30


Labs: 





 Laboratory Results - last 24 hr











  07/02/17 07/02/17





  15:50 15:50


 


PT   17.1 H


 


INR   1.5


 


APTT   43 H


 


Total Creatine Kinase  33 


 


CK-MB (Mass)  0.41 


 


Troponin I, Quant  < 0.0120 


 


Triglycerides  267 H 


 


Cholesterol  151 


 


LDL Cholesterol Direct  94 


 


HDL Cholesterol  23 L 














Assessment & Plan


(1) Dizzinesses


Status: Acute   





(2) Paroxysmal atrial fibrillation with rapid ventricular response


Status: Acute

## 2017-07-02 NOTE — CP.PCM.CON
History of Present Illness





- History of Present Illness


History of Present Illness: 





76 year old with HTN, prior diagnosis of paroxysmal A fib, admitted with 

palpitation, a fib, on xarelto, , 2 mos ago echo with nl LV, NL TSH now, rate 

controlled, d/c digoxin, observe





Review of Systems





- Review of Systems


Systems not reviewed;Unavailable: Unstable Vital Signs





- Constitutional


Constitutional: Anorexia, Weakness





- EENT


Eyes: absent: Discharge


Ears: absent: Ear Discharge


Nose/Mouth/Throat: absent: Epistaxis





- Cardiovascular


Cardiovascular: Palpitations.  absent: Acrocyanosis, Chest Pain, Diaphoresis, 

Leg Edema, Syncope





- Respiratory


Respiratory: Cough.  absent: Dyspnea, Hemoptysis, Pain on Inspiration





- Gastrointestinal


Gastrointestinal: absent: Abdominal Pain, Cramping, Diarrhea, Vomiting





- Genitourinary


Genitourinary: absent: Change in Urinary Stream





Past Patient History





- Infectious Disease


Hx of Infectious Diseases: None





- Past Social History


Smoking Status: Never Smoked





- CARDIAC


Hx Atrial Fibrillation: Yes


Hx Cardia Arrhythmia: Yes (unknown tachycardia)


Hx Hypertension: Yes





- PULMONARY


Hx Pneumonia: Yes





- PSYCHIATRIC


Hx Substance Use: No





- SURGICAL HISTORY


Hx Surgeries: No





- ANESTHESIA


Hx Anesthesia: No





Meds


Allergies/Adverse Reactions: 


 Allergies











Allergy/AdvReac Type Severity Reaction Status Date / Time


 


No Known Allergies Allergy   Verified 07/02/17 12:15














- Medications


Medications: 


 Current Medications





Diltiazem HCl (Cardizem)  30 mg PO QID [facility]


   Last Admin: 07/02/17 18:02 Dose:  30 mg


Metoprolol Tartrate (Lopressor)  50 mg PO BID [facility]


Rivaroxaban (Xarelto)  20 mg PO DAILY [facility]


   Last Admin: 07/02/17 15:50 Dose:  Not Given


Rosuvastatin Calcium (Crestor)  10 mg PO [facility]











Physical Exam





- Constitutional


Appears: Non-toxic





- Head Exam


Head Exam: ATRAUMATIC





- Eye Exam


Eye Exam: EOMI





- ENT Exam


ENT Exam: Mucous Membranes Moist





- Neck Exam


Neck exam: Negative for: Lymphadenopathy, Thyromegaly





- Respiratory Exam


Respiratory Exam: Clear to Auscultation Bilateral.  absent: Rales





- Cardiovascular Exam


Cardiovascular Exam: Irregular Rhythm, Systolic Murmur





- GI/Abdominal Exam


GI & Abdominal Exam: Normal Bowel Sounds.  absent: Organomegaly





- Rectal Exam


Rectal Exam: Deferred





- Extremities Exam


Extremities exam: Positive for: normal capillary refill.  Negative for: calf 

tenderness





- Neurological Exam


Neurological exam: Alert, Oriented x3





- Psychiatric Exam


Psychiatric exam: Normal Mood





- Skin


Skin Exam: Dry





Results





- Vital Signs


Recent Vital Signs: 


 Last Vital Signs











Temp  97.3 F L  07/02/17 17:19


 


Pulse  65   07/02/17 17:19


 


Resp  20   07/02/17 17:19


 


BP  119/66   07/02/17 17:19


 


Pulse Ox  95   07/02/17 17:19














- Labs


Result Diagrams: 


 07/02/17 12:30





 07/02/17 12:30


Labs: 


 Laboratory Results - last 24 hr











  07/02/17 07/02/17





  15:50 15:50


 


PT   17.1 H


 


INR   1.5


 


APTT   43 H


 


Total Creatine Kinase  33 


 


CK-MB (Mass)  0.41 


 


Troponin I, Quant  < 0.0120 


 


Triglycerides  267 H 


 


Cholesterol  151 


 


LDL Cholesterol Direct  94 


 


HDL Cholesterol  23 L 














Assessment & Plan


(1) Paroxysmal atrial fibrillation with rapid ventricular response


Status: Acute   


Comment: rate controlled, d/c dig, b blocker

## 2017-07-03 VITALS — OXYGEN SATURATION: 97 % | TEMPERATURE: 97.4 F

## 2017-07-03 VITALS — HEART RATE: 111 BPM | SYSTOLIC BLOOD PRESSURE: 120 MMHG | DIASTOLIC BLOOD PRESSURE: 63 MMHG

## 2017-07-03 VITALS — RESPIRATION RATE: 20 BRPM

## 2017-07-03 NOTE — CP.PCM.PN
Subjective





- Date & Time of Evaluation


Date of Evaluation: 07/03/17


Time of Evaluation: 11:00





- Subjective


Subjective: 





Awake, alert, denies sob or chest pains, NAD.





Objective





- Vital Signs/Intake and Output


Vital Signs (last 24 hours): 


 











Temp Pulse Resp BP Pulse Ox


 


 97.4 F L  111 H  20   120/63   97 


 


 07/03/17 07:10  07/03/17 10:25  07/03/17 07:10  07/03/17 10:25  07/03/17 07:10











- Medications


Medications: 


 Current Medications





Diltiazem HCl (Cardizem)  30 mg PO QID [facility]


   Last Admin: 07/03/17 10:23 Dose:  30 mg


Metoprolol Tartrate (Lopressor)  50 mg PO BID [facility]


   Last Admin: 07/03/17 10:23 Dose:  50 mg


Rivaroxaban (Xarelto)  20 mg PO DAILY [facility]


   Last Admin: 07/03/17 10:23 Dose:  20 mg


Rosuvastatin Calcium (Crestor)  10 mg PO HS [facility]


   Last Admin: 07/02/17 21:58 Dose:  10 mg











- Labs


Labs: 


 











PT  17.1 SECONDS (9.7-12.2)  H  07/02/17  15:50    


 


INR  1.5   07/02/17  15:50    


 


APTT  43 SECONDS (21-34)  H  07/02/17  15:50    














Assessment and Plan





- Assessment and Plan (Free Text)


Assessment: 





Patient is seen and examined. No sob or chest pains, heart rate decreased to 108

-110e, NAD. Cleared by DR Samayoa for discharge home on cardizem and metoprolol. 

Advised to follow up in theoffice in 1 week.

## 2017-07-03 NOTE — CARD
--------------- APPROVED REPORT --------------





EKG Measurement

Heart [id number]

[id number]

[id number]

[id number]



<Conclusion>

Atrial fibrillation with rapid ventricular response

Possible Anterior infarct, age undetermined

Abnormal ECG

## 2017-07-03 NOTE — CP.PCM.PN
Subjective





- Date & Time of Evaluation


Date of Evaluation: 07/03/17


Time of Evaluation: 12:00





- Subjective


Subjective: 





stable clinically, rate controlled on b blocker, no digoxin, on NOAG 





Objective





- Vital Signs/Intake and Output


Vital Signs (last 24 hours): 


 











Temp Pulse Resp BP Pulse Ox


 


 97.4 F L  111 H  20   120/63   97 


 


 07/03/17 07:10  07/03/17 10:25  07/03/17 07:10  07/03/17 10:25  07/03/17 07:10











- Medications


Medications: 


 Current Medications





Digoxin (Lanoxin)  0.25 mg PO DAILY@1800 [facility]


Diltiazem HCl (Cardizem)  30 mg PO QID [facility]


   Last Admin: 07/03/17 10:23 Dose:  30 mg


Metoprolol Tartrate (Lopressor)  50 mg PO BID [facility]


   Last Admin: 07/03/17 10:23 Dose:  50 mg


Rivaroxaban (Xarelto)  20 mg PO DAILY [facility]


   Last Admin: 07/03/17 10:23 Dose:  20 mg


Rosuvastatin Calcium (Crestor)  10 mg PO HS [facility]


   Last Admin: 07/02/17 21:58 Dose:  10 mg











- Labs


Labs: 


 











PT  17.1 SECONDS (9.7-12.2)  H  07/02/17  15:50    


 


INR  1.5   07/02/17  15:50    


 


APTT  43 SECONDS (21-34)  H  07/02/17  15:50    














- Constitutional


Appears: Non-toxic





- Head Exam


Head Exam: ATRAUMATIC





- Eye Exam


Eye Exam: EOMI





- ENT Exam


ENT Exam: Mucous Membranes Moist





- Neck Exam


Neck Exam: absent: Lymphadenopathy, Thyromegaly





- Respiratory Exam


Respiratory Exam: Clear to Ausculation Bilateral.  absent: Rales





- Cardiovascular Exam


Cardiovascular Exam: Irregular Rhythm, Murmur





- Rectal Exam


Rectal Exam: Deferred





- Extremities Exam


Extremities Exam: Normal Capillary Refill.  absent: Calf Tenderness





- Neurological Exam


Neurological Exam: Alert, Oriented x3





- Psychiatric Exam


Psychiatric exam: Normal Mood





- Skin


Skin Exam: Dry





Assessment and Plan


(1) Paroxysmal atrial fibrillation with rapid ventricular response


Status: Acute

## 2017-07-04 NOTE — CP.PCM.PN
Subjective





- Date & Time of Evaluation


Date of Evaluation: 07/03/17


Time of Evaluation: 19:00





- Subjective


Subjective: 





Patient is seen and examined. No sob or chest pains, hear rate decreased to 108-

110e, NAD. Cleared by cardiology for discharge home on cardizem and metoprolol.





Objective





- Vital Signs/Intake and Output


Vital Signs (last 24 hours): 


 











Temp Pulse Resp BP Pulse Ox


 


 97.4 F L  111 H  20   120/63   97 


 


 07/03/17 07:10  07/03/17 10:25  07/03/17 07:10  07/03/17 10:25  07/03/17 07:10











- Labs


Labs: 


 











PT  17.1 SECONDS (9.7-12.2)  H  07/02/17  15:50    


 


INR  1.5   07/02/17  15:50    


 


APTT  43 SECONDS (21-34)  H  07/02/17  15:50    














- Constitutional


Appears: No Acute Distress





- Head Exam


Head Exam: ATRAUMATIC, NORMAL INSPECTION, NORMOCEPHALIC





- Eye Exam


Eye Exam: EOMI, Normal appearance, PERRL


Pupil Exam: NORMAL ACCOMODATION, PERRL





- Respiratory Exam


Respiratory Exam: Clear to Ausculation Bilateral, NORMAL BREATHING PATTERN





- Cardiovascular Exam


Cardiovascular Exam: Irregular Rhythm





- GI/Abdominal Exam


GI & Abdominal Exam: Soft, Normal Bowel Sounds.  absent: Tenderness





Assessment and Plan


(1) Dizzinesses


Status: Acute   





(2) Paroxysmal atrial fibrillation with rapid ventricular response


Status: Acute

## 2017-07-04 NOTE — CP.PCM.DIS
Provider





- Provider


Date of Admission: 


07/02/17 13:25





Attending physician: 


Vaibhav Edwards MD





Time Spent in preparation of Discharge (in minutes): 39





Hospital Course





- Lab Results


Lab Results: 


 Most Recent Lab Values











WBC  8.0 K/uL (4.8-10.8)   07/02/17  12:30    


 


RBC  4.97 Mil/uL (3.80-5.20)   07/02/17  12:30    


 


Hgb  15.0 g/dL (11.0-16.0)   07/02/17  12:30    


 


Hct  45.7 % (34.0-47.0)   07/02/17  12:30    


 


MCV  92.1 fL (81.0-99.0)   07/02/17  12:30    


 


MCH  30.2 pg (27.0-31.0)   07/02/17  12:30    


 


MCHC  32.7 g/dL (33.0-37.0)  L  07/02/17  12:30    


 


RDW  14.6 % (11.5-14.5)  H  07/02/17  12:30    


 


Plt Count  286 K/uL (130-400)   07/02/17  12:30    


 


MPV  8.8 fL (7.2-11.7)   07/02/17  12:30    


 


Neut % (Auto)  59.8 % (50.0-75.0)   07/02/17  12:30    


 


Lymph % (Auto)  26.5 % (20.0-40.0)   07/02/17  12:30    


 


Mono % (Auto)  10.9 % (0.0-10.0)  H  07/02/17  12:30    


 


Eos % (Auto)  2.3 % (0.0-4.0)   07/02/17  12:30    


 


Baso % (Auto)  0.5 % (0.0-2.0)   07/02/17  12:30    


 


Neut #  4.8 K/uL (1.8-7.0)   07/02/17  12:30    


 


Lymph #  2.1 K/uL (1.0-4.3)   07/02/17  12:30    


 


Mono #  0.9 K/uL (0.0-0.8)  H  07/02/17  12:30    


 


Eos #  0.2 K/uL (0.0-0.7)   07/02/17  12:30    


 


Baso #  0.0 K/uL (0.0-0.2)   07/02/17  12:30    


 


PT  17.1 SECONDS (9.7-12.2)  H  07/02/17  15:50    


 


INR  1.5   07/02/17  15:50    


 


APTT  43 SECONDS (21-34)  H  07/02/17  15:50    


 


Sodium  147 mmol/L (132-148)   07/02/17  12:30    


 


Potassium  4.4 mmol/L (3.6-5.2)   07/02/17  12:30    


 


Chloride  106 mmol/L ()   07/02/17  12:30    


 


Carbon Dioxide  30 mmol/L (22-30)   07/02/17  12:30    


 


Anion Gap  15  (10-20)   07/02/17  12:30    


 


BUN  23 mg/dL (7-17)  H  07/02/17  12:30    


 


Creatinine  0.9 MG/DL (0.7-1.2)   07/02/17  12:30    


 


Est GFR ( Amer)  > 60   07/02/17  12:30    


 


Est GFR (Non-Af Amer)  > 60   07/02/17  12:30    


 


Random Glucose  116 mg/dL ()  H  07/02/17  12:30    


 


Calcium  9.8 mg/dl (8.6-10.4)   07/02/17  12:30    


 


Total Bilirubin  0.4 mg/dL (0.2-1.3)   07/02/17  12:30    


 


AST  27 U/L (14-36)   07/02/17  12:30    


 


ALT  26 U/L (9-52)   07/02/17  12:30    


 


Alkaline Phosphatase  54 U/L ()   07/02/17  12:30    


 


Total Creatine Kinase  33 U/L ()   07/02/17  15:50    


 


CK-MB (Mass)  0.41 ng/mL (0.0-3.38)   07/02/17  15:50    


 


Troponin I  < 0.0120 ng/mL (0.00-0.120)   07/02/17  12:30    


 


Troponin I, Quant  < 0.0120 ng/mL (0.00-0.120)   07/02/17  15:50    


 


NT-Pro-B Natriuret Pep  2650 pg/mL (0-900)  H  07/02/17  12:30    


 


Total Protein  7.4 g/dL (6.3-8.3)   07/02/17  12:30    


 


Albumin  4.0 g/dL (3.5-5.0)   07/02/17  12:30    


 


Globulin  3.5 gm/dL (2.2-3.9)   07/02/17  12:30    


 


Albumin/Globulin Ratio  1.1  (1.0-2.1)   07/02/17  12:30    


 


Triglycerides  267 mg/dL (0-149)  H  07/02/17  15:50    


 


Cholesterol  151 mg/dL (0-199)   07/02/17  15:50    


 


LDL Cholesterol Direct  94 mg/dL (0-129)   07/02/17  15:50    


 


HDL Cholesterol  23 mg/dL (30-70)  L  07/02/17  15:50    


 


Thyroxine (T4)  7.70 ug/dL (5.5-11.0)   07/02/17  12:30    


 


T3 Uptake  36.0 % (23.0-41.0)   07/02/17  12:30    


 


TSH 3rd Generation  0.61 mIU/L (0.46-4.68)   07/02/17  12:30    


 


Urine Color  Yellow  (YELLOW)   07/02/17  12:30    


 


Urine Clarity  Hazy  (Clear)   07/02/17  12:30    


 


Urine pH  5.0  (5.0-8.0)   07/02/17  12:30    


 


Ur Specific Gravity  1.024  (1.003-1.030)   07/02/17  12:30    


 


Urine Protein  Negative mg/dL (NEGATIVE)   07/02/17  12:30    


 


Urine Glucose (UA)  Normal mg/dL (Normal)   07/02/17  12:30    


 


Urine Ketones  Negative mg/dL (NEGATIVE)   07/02/17  12:30    


 


Urine Blood  Negative  (NEGATIVE)   07/02/17  12:30    


 


Urine Nitrate  Negative  (NEGATIVE)   07/02/17  12:30    


 


Urine Bilirubin  Negative  (NEGATIVE)   07/02/17  12:30    


 


Urine Urobilinogen  Normal mg/dL (0.2-1.0)   07/02/17  12:30    


 


Ur Leukocyte Esterase  2+ Cristina/uL (Negative)  H  07/02/17  12:30    


 


Urine WBC (Auto)  5 /hpf (0-5)   07/02/17  12:30    


 


Urine RBC (Auto)  2 /hpf (0-3)   07/02/17  12:30    


 


Ur Squamous Epith Cells  3 /hpf (0-5)   07/02/17  12:30    














- Hospital Course


Hospital Course: 





Pt was admitted with c/o dizziness, palpitation and SOB on exertion,known case 

of A.Fib, had rapid A.Fib with ventricular respobnse, was treated and pt 

responded well.


Patient is seen and examined. No sob or chest pains, hear rate decreased to 108-

110e, NAD. Cleared by DR Samayoa for discharge home on cardizem and metoprolol.





Discharge Exam





- Head Exam


Head Exam: ATRAUMATIC





- Eye Exam


Eye Exam: EOMI, Normal appearance, PERRL


Pupil Exam: NORMAL ACCOMODATION, PERRL





- ENT Exam


ENT Exam: Mucous Membranes Moist





- Respiratory Exam


Respiratory Exam: Clear to PA & Lateral





- Cardiovascular Exam


Cardiovascular Exam: Irregular Rhythm, +S1, +S2





- GI/Abdominal Exam


GI & Abdominal Exam: Normal Bowel Sounds





- Rectal Exam


Rectal Exam: Deferred





Discharge Plan





- Discharge Medications


Prescriptions: 


diltiaZEM CD [Cardizem CD] 120 mg PO DAILY #30 c24


Metoprolol Tartrate [Lopressor] 50 mg PO BID #60 tab





- Follow Up Plan


Condition: IMPROVED


Disposition: HOME/ ROUTINE


Instructions:  Metoprolol (By mouth), Diltiazem (By mouth), Atrial Fibrillation 

(DC)


Additional Instructions: 


TAKE YOUR MEDICATIONS AS INSTRUCTED BY YOUR PMD.


PLEASE FOLLOW UP WITH [facility] AND YOUR CARDIOLOGIST, PLEASE CALL TO GET AN 

APPOINTMENT.


IF SYMPTOMS PERSIST ,GO TO THE NEAREST EMERGENCY ROOM.


Referrals: 


Vaibhav Edwards MD [Staff Provider] - 


Lamar Samayoa MD [Staff Provider] -

## 2017-07-10 NOTE — CARD
--------------- APPROVED REPORT --------------





EKG Measurement

Heart [id number]

[id number]

[id number]

[id number]



<Conclusion>

Atrial fibrillation with rapid ventricular response with premature 

ventricular or aberrantly conducted complexes

Nonspecific T wave abnormality

Abnormal ECG

## 2017-12-28 ENCOUNTER — HOSPITAL ENCOUNTER (OUTPATIENT)
Dept: HOSPITAL 31 - C.ER | Age: 77
Setting detail: OBSERVATION
LOS: 1 days | Discharge: HOME | End: 2017-12-29
Attending: INTERNAL MEDICINE | Admitting: INTERNAL MEDICINE
Payer: SELF-PAY

## 2017-12-28 VITALS — HEART RATE: 116 BPM

## 2017-12-28 VITALS — BODY MASS INDEX: 33.3 KG/M2

## 2017-12-28 DIAGNOSIS — I10: ICD-10-CM

## 2017-12-28 DIAGNOSIS — E03.9: ICD-10-CM

## 2017-12-28 DIAGNOSIS — I48.0: Primary | ICD-10-CM

## 2017-12-28 DIAGNOSIS — M19.011: ICD-10-CM

## 2017-12-28 LAB
ALBUMIN/GLOB SERPL: 1.2 {RATIO} (ref 1–2.1)
ALP SERPL-CCNC: 45 U/L (ref 38–126)
ALT SERPL-CCNC: 40 U/L (ref 9–52)
APTT BLD: 40 SECONDS (ref 21–34)
AST SERPL-CCNC: 26 U/L (ref 14–36)
BASOPHILS # BLD AUTO: 0 K/UL (ref 0–0.2)
BASOPHILS NFR BLD: 0.5 % (ref 0–2)
BILIRUB SERPL-MCNC: 0.3 MG/DL (ref 0.2–1.3)
BUN SERPL-MCNC: 21 MG/DL (ref 7–17)
CALCIUM SERPL-MCNC: 8.5 MG/DL (ref 8.6–10.4)
CHLORIDE SERPL-SCNC: 104 MMOL/L (ref 98–107)
CO2 SERPL-SCNC: 28 MMOL/L (ref 22–30)
EOSINOPHIL # BLD AUTO: 0.2 K/UL (ref 0–0.7)
EOSINOPHIL NFR BLD: 3.2 % (ref 0–4)
ERYTHROCYTE [DISTWIDTH] IN BLOOD BY AUTOMATED COUNT: 15.2 % (ref 11.5–14.5)
GLOBULIN SER-MCNC: 3 GM/DL (ref 2.2–3.9)
GLUCOSE SERPL-MCNC: 121 MG/DL (ref 65–105)
HCT VFR BLD CALC: 42.3 % (ref 34–47)
INR PPP: 1.6
LYMPHOCYTES # BLD AUTO: 1 K/UL (ref 1–4.3)
LYMPHOCYTES NFR BLD AUTO: 18.4 % (ref 20–40)
MCH RBC QN AUTO: 31.7 PG (ref 27–31)
MCHC RBC AUTO-ENTMCNC: 33.7 G/DL (ref 33–37)
MCV RBC AUTO: 94.2 FL (ref 81–99)
MONOCYTES # BLD: 0.6 K/UL (ref 0–0.8)
MONOCYTES NFR BLD: 10.7 % (ref 0–10)
NRBC BLD AUTO-RTO: 0 % (ref 0–2)
PLATELET # BLD: 240 K/UL (ref 130–400)
PMV BLD AUTO: 9.5 FL (ref 7.2–11.7)
POTASSIUM SERPL-SCNC: 3.9 MMOL/L (ref 3.6–5.2)
PROT SERPL-MCNC: 6.7 G/DL (ref 6.3–8.3)
SODIUM SERPL-SCNC: 140 MMOL/L (ref 132–148)
WBC # BLD AUTO: 5.3 K/UL (ref 4.8–10.8)

## 2017-12-28 PROCEDURE — 85730 THROMBOPLASTIN TIME PARTIAL: CPT

## 2017-12-28 PROCEDURE — 82553 CREATINE MB FRACTION: CPT

## 2017-12-28 PROCEDURE — 99285 EMERGENCY DEPT VISIT HI MDM: CPT

## 2017-12-28 PROCEDURE — 70450 CT HEAD/BRAIN W/O DYE: CPT

## 2017-12-28 PROCEDURE — 73030 X-RAY EXAM OF SHOULDER: CPT

## 2017-12-28 PROCEDURE — 80053 COMPREHEN METABOLIC PANEL: CPT

## 2017-12-28 PROCEDURE — 90674 CCIIV4 VAC NO PRSV 0.5 ML IM: CPT

## 2017-12-28 PROCEDURE — 96374 THER/PROPH/DIAG INJ IV PUSH: CPT

## 2017-12-28 PROCEDURE — 82550 ASSAY OF CK (CPK): CPT

## 2017-12-28 PROCEDURE — 84443 ASSAY THYROID STIM HORMONE: CPT

## 2017-12-28 PROCEDURE — 71010: CPT

## 2017-12-28 PROCEDURE — 85610 PROTHROMBIN TIME: CPT

## 2017-12-28 PROCEDURE — 36415 COLL VENOUS BLD VENIPUNCTURE: CPT

## 2017-12-28 PROCEDURE — 90471 IMMUNIZATION ADMIN: CPT

## 2017-12-28 PROCEDURE — 85025 COMPLETE CBC W/AUTO DIFF WBC: CPT

## 2017-12-28 PROCEDURE — 84484 ASSAY OF TROPONIN QUANT: CPT

## 2017-12-28 NOTE — CP.PCM.HP
<Abadier,Michael - Last Filed: 12/29/17 03:58>





History of Present Illness





- History of Present Illness


History of Present Illness: 





Medicine H/P





CC: Back pain, R. arm weakness





HPI: Patient is a 77  female w/ PMH of Afib/HTN. She was [place] with 

[place]ib [place] 1[place] when she had pneumonia. At that 

time she was put on metoprolol and xarelto. Patient then came [place] the [place] where 

she goes see a doctor in a clinic where they monitor her condition. She does 

not know the name of the doctor nor does she know if she sees a cardiologist. 

On presentation to the ED her HR was elevated @ 118. Cardizem was given and her 

HR was brought down to 102. She has been complaining of palpations as well. Her 

new symptoms of weaknes and pain began earlier today. Nothing made it better or 

worse, but it has resolved upon my interview. She describes the pain as 

weakness and "pain" traveling down the R. arm. She denies chest pain or SOB. 

She sleeps with one pillow at night. She ambulates independently. Denies fever 

or chills. Denies blurry vision, syncope, changes in speech or mentation. 

Denies any cough. 





ROS: as above





PMD: A clinic, no name of doctor





PMH: Afib/HTN


PSH: None


FH: sister had cancer


SH: denies smoking, drinking or drugs, Lives with her daughter


All: None








Present on Admission





- Present on Admission


Any Indicators Present on Admission: No





Review of Systems





- Review of Systems


Review of Systems: 





Per hpi





Past Patient History





- Infectious Disease


Hx of Infectious Diseases: None





- Past Medical History & Family History


Past Medical History?: Yes





- Past Social History


Smoking Status: Never Smoked





- CARDIAC


Hx Atrial Fibrillation: Yes


Hx Cardia Arrhythmia: Yes (unknown tachycardia)


Hx Hypertension: Yes





- PULMONARY


Hx Pneumonia: Yes





- NEUROLOGICAL


Hx Neurological Disorder: No





- HEENT


Hx HEENT Problems: No





- RENAL


Hx Chronic Kidney Disease: No





- ENDOCRINE/METABOLIC


Hx Endocrine Disorders: No





- HEMATOLOGICAL/ONCOLOGICAL


Hx Blood Disorders: No





- INTEGUMENTARY


Hx Dermatological Problems: No





- MUSCULOSKELETAL/RHEUMATOLOGICAL


Hx Musculoskeletal Disorders: No





- GASTROINTESTINAL


Hx Gastrointestinal Disorders: No





- GENITOURINARY/GYNECOLOGICAL


Hx Genitourinary Disorders: No





- PSYCHIATRIC


Hx Substance Use: No





- SURGICAL HISTORY


Hx Surgeries: No





- ANESTHESIA


Hx Anesthesia: No





Meds


Allergies/Adverse Reactions: 


 Allergies











Allergy/AdvReac Type Severity Reaction Status Date / Time


 


No Known Allergies Allergy   Verified 12/28/17 12:31














Physical Exam





- Constitutional


Appears: Non-toxic, No Acute Distress





- Head Exam


Head Exam: ATRAUMATIC, NORMAL INSPECTION, NORMOCEPHALIC





- Eye Exam


Eye Exam: EOMI.  absent: Conjunctival injection, Nystagmus, Periorbital swelling

, Scleral icterus


Pupil Exam: NORMAL ACCOMODATION, PERRL.  absent: Fixed, Irregular, Miosis, 

Mydriatic, Unequal





- ENT Exam


ENT Exam: Mucous Membranes Moist





- Respiratory Exam


Respiratory Exam: Clear to Auscultation Bilateral, NORMAL BREATHING PATTERN.  

absent: Accessory Muscle Use, Chest Wall Tenderness, Rales, Rhonchi, Wheezes, 

Respiratory Distress





- Cardiovascular Exam


Cardiovascular Exam: Tachycardia, Irregular Rhythm, JVD, +S1, +S2





- GI/Abdominal Exam


GI & Abdominal Exam: Normal Bowel Sounds, Soft.  absent: Distended, Tenderness





- Extremities Exam


Extremities exam: Negative for: joint swelling, tenderness





- Neurological Exam


Neurological exam: Alert, CN II-XII Intact, Oriented x3





- Expanded Neurological Exam


  ** Expanded


Patient oriented to: person, place, time


Speech: Fluid Speech


Cranial nerves: EOM's Intact: Normal, Facial Palsey w/Forehead Movement: Normal

, Facial Palsey w/o Forehead Movement: Normal, Facial Sensation: Normal


Sensory exam: Lower Extremity Light Touch: Normal, Upper Extremity Light Touch: 

Normal


Neuro motor strength exam: Left Upper Extremity: 5, Right Upper Extremity: 5, 

Left Lower Extremity: 5, Right Lower Extremity: 5


Coma Scale Eye Opening: SPONTANEOUS


Coma Scale Motor Response: OBEYS COMMANDS


Coma Scale Verbal: Oriented


Coma Scale Total: 15





- Psychiatric Exam


Psychiatric exam: Normal Affect, Normal Mood





- Skin


Skin Exam: Dry, Intact, Normal Color, Warm





Results





- Vital Signs


Recent Vital Signs: 





 Last Vital Signs











Temp  98.0 F   12/28/17 19:55


 


Pulse  127 H  12/28/17 20:42


 


Resp  18   12/28/17 19:55


 


BP  113/85   12/28/17 19:55


 


Pulse Ox  94 L  12/28/17 20:12














- Labs


Result Diagrams: 


 12/28/17 13:30





 12/28/17 14:24


Labs: 





 Laboratory Results - last 24 hr











  12/28/17 12/28/17 12/28/17





  13:30 13:30 14:24


 


WBC  5.3  


 


RBC  4.49  


 


Hgb  14.2  


 


Hct  42.3  


 


MCV  94.2  D  


 


MCH  31.7 H  


 


MCHC  33.7  


 


RDW  15.2 H  


 


Plt Count  240  


 


MPV  9.5  


 


Neut % (Auto)  67.2  


 


Lymph % (Auto)  18.4 L  


 


Mono % (Auto)  10.7 H  


 


Eos % (Auto)  3.2  


 


Baso % (Auto)  0.5  


 


Neut #  3.6  


 


Lymph #  1.0  


 


Mono #  0.6  


 


Eos #  0.2  


 


Baso #  0.0  


 


PT   18.5 H 


 


INR   1.6 


 


APTT   40 H 


 


Sodium    140


 


Potassium    3.9


 


Chloride    104


 


Carbon Dioxide    28


 


Anion Gap    11


 


BUN    21 H


 


Creatinine    0.9


 


Est GFR ( Amer)    > 60


 


Est GFR (Non-Af Amer)    > 60


 


Random Glucose    121 H


 


Calcium    8.5 L


 


Total Bilirubin    0.3


 


AST    26


 


ALT    40


 


Alkaline Phosphatase    45


 


Total Creatine Kinase    32


 


CK-MB (Mass)    < 0.22


 


Troponin I    < 0.0120


 


Total Protein    6.7


 


Albumin    3.7


 


Globulin    3.0


 


Albumin/Globulin Ratio    1.2














Assessment & Plan


(1) A-fib


Assessment and Plan: 


Given cardizem 5 IV once and 30 PO Once in ED


Patient takes xarelto 20 PO QD/metoprolol 50 PO BID/cardizem   at home, 

will continue


tele


will check TSH given history of Hypothyroid tx with Synthroid 25 PO QD, Maybe 

overtx causing rapid afib?








Status: Chronic   Priority: High   





(2) Hypothyroidism


Assessment and Plan: 


continue home synthorid 25 PO QD


Check TSH


Status: Chronic   Priority: High   





<Brijesh Panda P - Last Filed: 12/29/17 06:36>





Results





- Vital Signs


Recent Vital Signs: 





 Last Vital Signs











Temp  97.9 F   12/29/17 04:05


 


Pulse  71   12/29/17 04:05


 


Resp  20   12/29/17 04:05


 


BP  98/59 L  12/29/17 04:05


 


Pulse Ox  96   12/29/17 04:05














- Labs


Result Diagrams: 


 12/28/17 13:30





 12/28/17 14:24


Labs: 





 Laboratory Results - last 24 hr











  12/28/17 12/28/17 12/28/17





  13:30 13:30 14:24


 


WBC  5.3  


 


RBC  4.49  


 


Hgb  14.2  


 


Hct  42.3  


 


MCV  94.2  D  


 


MCH  31.7 H  


 


MCHC  33.7  


 


RDW  15.2 H  


 


Plt Count  240  


 


MPV  9.5  


 


Neut % (Auto)  67.2  


 


Lymph % (Auto)  18.4 L  


 


Mono % (Auto)  10.7 H  


 


Eos % (Auto)  3.2  


 


Baso % (Auto)  0.5  


 


Neut #  3.6  


 


Lymph #  1.0  


 


Mono #  0.6  


 


Eos #  0.2  


 


Baso #  0.0  


 


PT   18.5 H 


 


INR   1.6 


 


APTT   40 H 


 


Sodium    140


 


Potassium    3.9


 


Chloride    104


 


Carbon Dioxide    28


 


Anion Gap    11


 


BUN    21 H


 


Creatinine    0.9


 


Est GFR ( Amer)    > 60


 


Est GFR (Non-Af Amer)    > 60


 


Random Glucose    121 H


 


Calcium    8.5 L


 


Total Bilirubin    0.3


 


AST    26


 


ALT    40


 


Alkaline Phosphatase    45


 


Total Creatine Kinase    32


 


CK-MB (Mass)    < 0.22


 


Troponin I    < 0.0120


 


Total Protein    6.7


 


Albumin    3.7


 


Globulin    3.0


 


Albumin/Globulin Ratio    1.2














Attending/Attestation





- Attestation


I have personally seen and examined this patient.: Yes


I have fully participated in the care of the patient.: Yes


I have reviewed all pertinent clinical information: Yes


Notes (Text): 





Assessment


* Patient came for vague symptoms of headache, back pain, nausea was found to 

have hr in one teens in ER has h/o fib hence being observed, symptoms resolved 

on floor, no weakness or neurological symptoms, hr in 120's mean variable, 

patient is not symptomatic.


Plan


* Observation in tele


* Continue home med, including xeralto


* See orders for detail.

## 2017-12-28 NOTE — C.PDOC
History Of Present Illness


76 y/o female  hx cardiac A-Fib presents to the ED c/o upper back pain and SOB. 

The patient denies cough, fever, headaches, and nausea. 


Chief Complaint (Nursing): Back Pain


History Per: Patient


History/Exam Limitations: no limitations


Onset/Duration Of Symptoms: Hrs


Current Symptoms Are (Timing): Still Present


Previous Symptoms: Back Pain


Associated Symptoms: denies: New Weakness, New Numbness


Additional History Per: Patient





Past Medical History


Reviewed: Historical Data, Nursing Documentation, Vital Signs


Vital Signs: 


 Last Vital Signs











Temp  97.5 F L  12/28/17 16:36


 


Pulse  118 H  12/28/17 18:38


 


Resp  20   12/28/17 18:38


 


BP  125/74   12/28/17 18:38


 


Pulse Ox  96   12/28/17 18:45














- Medical History


PMH: Atrial Fibrillation, Cardia Arrhythmia (unknown tachycardia), HTN, 

Pneumonia


   Denies: Chronic Kidney Disease


Surgical History: No Surg Hx


Family History: States: No Known Family Hx





- Social History


Hx Alcohol Use: No


Hx Substance Use: No





- Immunization History


Hx Tetanus Toxoid Vaccination: No


Hx Influenza Vaccination: No


Hx Pneumococcal Vaccination: No





Review Of Systems


Except As Marked, All Systems Reviewed And Found Negative.


Constitutional: Negative for: Fever, Chills


Cardiovascular: Negative for: Chest Pain


Respiratory: Positive for: Shortness of Breath.  Negative for: Cough, SOB with 

Excertion, Wheezing


Gastrointestinal: Negative for: Nausea, Vomiting, Abdominal Pain, Diarrhea


Musculoskeletal: Positive for: Back Pain (upper ).  Negative for: Shoulder Pain


Skin: Negative for: Rash





Physical Exam





- Physical Exam


Appears: Non-toxic, No Acute Distress


Skin: Warm, Dry


Head: Atraumatic, Normacephalic


Eye(s): bilateral: Normal Inspection


Oral Mucosa: Moist


Neck: Supple


Chest: Symmetrical


Cardiovascular: Rhythm Irregular


Respiratory: Normal Breath Sounds, No Rales, No Rhonchi, No Wheezing


Gastrointestinal/Abdominal: Soft, No Tenderness, No Guarding, No Rebound


Back: Normal Inspection, No Vertebral Tenderness


Extremity: Tenderness (right shoulder, decreased ROM secondary to pain), 

Capillary Refill (2<sec.), No Swelling


Neurological/Psych: Oriented x3, Normal Speech, Normal Cognition


Gait: Steady





ED Course And Treatment





- Laboratory Results


Result Diagrams: 


 12/28/17 13:30





 12/28/17 14:24


Interpretation Of ECG: Rapid Ventricular response.  Heart rate 113 bpm


O2 Sat by Pulse Oximetry: 96 (RA)


Progress Note: Plan: blood work, CT head, CXR, right shoulder xray. Patient 

found to have rapid afib. Cardizem IV given. CT head, CXR and right shoulder 

xray were negative.  Case was d/w Hospitalist  who accepted patient 

tele for observation.





Medical Decision Making


Medical Decision Making: 


PROCEDURE:  Radiographs of the Right Shoulder





HISTORY:


pain





COMPARISON:


No prior.





FINDINGS:





BONES:


. No fracture.





JOINTS:


. Glenohumeral and acromioclavicular mild osteoarthritis. Cervical apophyseal 

joint hypertrophic arthrosis 





SOFT TISSUES:


Normal.





OTHER FINDINGS:


None.





IMPRESSION:


No fracture. Right shoulder arthrosis.  Cervical arthrosis








PROCEDURE:  CT HEAD WITHOUT CONTRAST.





HISTORY:


headache





COMPARISON:


None available. 





TECHNIQUE:


Axial computed tomography images were obtained through the head/brain without 

intravenous contrast.  





Radiation dose:





Total exam DLP = 846.50 mGy-cm.





This CT exam was performed using one or more of the following dose reduction 

techniques: Automated exposure control, adjustment of the mA and/or kV 

according to patient size, and/or use of iterative reconstruction technique.





FINDINGS:





HEMORRHAGE:


No intracranial hemorrhage. 





BRAIN:


Diffuse atrophy with prominence of the ventricles and sulci noted. No mass 

effect or edema.  Scattered periventricular and subcortical white matter 

hypodensities, which are nonspecific, but often seen with chronic microvascular 

ischemic disease. Please note that MRI with diffusion imaging is more sensitive 

in the detection of acute ischemic event.





VENTRICLES:


No hydrocephalus. 





CALVARIUM:


Unremarkable.





PARANASAL SINUSES:


Unremarkable as visualized. No significant inflammatory changes.





MASTOID AIR CELLS:


Unremarkable as visualized. No inflammatory changes.





OTHER FINDINGS:


None.





IMPRESSION:


Generalized atrophy.  Nonspecific white matter changes.





HISTORY:


SOB, upper back pain  





COMPARISON:


Chest x-ray performed 7/2/17 





TECHNIQUE:


Chest, one view.





FINDINGS:


Examination limited by habitus.





LUNGS:


Mild pulmonary venous congestion. Right hilar prominence. 





Please note that chest x-ray has limited sensitivity for the detection of 

pulmonary masses.





PLEURA:


No significant pleural effusion identified. No definite pneumothorax .





CARDIOVASCULAR:


Heart size appears top normal.  Atherosclerotic calcifications of the aorta.





OSSEOUS STRUCTURES:


 Degenerative changes.





VISUALIZED UPPER ABDOMEN:


Unremarkable.





OTHER FINDINGS:


None.





IMPRESSION:


Mild pulmonary venous congestion. Right hilar prominence.





Disposition





- Disposition


Disposition: HOSPITALIZED


Disposition Time: 17:40


Condition: FAIR





- Clinical Impression


Clinical Impression: 


 Upper back pain, Dyspnea, A-fib








- PA / NP / Resident Statement


MD/DO has examined the patient and agrees with the treatment plan.





- Scribe Statement


The provider has reviewed the documentation as recorded by the Mohsenibe


Carmelita Zavala 








Decision To Admit





- Pt Status Changed To:


Hospital Disposition Of: Observation





- .


Bed Request Type: Telemetry


Admitting Physician: Marcus Gutierrez


Patient Diagnosis: 


 Upper back pain, Dyspnea, A-fib

## 2017-12-28 NOTE — CT
PROCEDURE:  CT HEAD WITHOUT CONTRAST.



HISTORY:

headache



COMPARISON:

None available. 



TECHNIQUE:

Axial computed tomography images were obtained through the head/brain 

without intravenous contrast.  



Radiation dose:



Total exam DLP = 846.50 mGy-cm.



This CT exam was performed using one or more of the following dose 

reduction techniques: Automated exposure control, adjustment of the 

mA and/or kV according to patient size, and/or use of iterative 

reconstruction technique.



FINDINGS:



HEMORRHAGE:

No intracranial hemorrhage. 



BRAIN:

Diffuse atrophy with prominence of the ventricles and sulci noted. No 

mass effect or edema.  Scattered periventricular and subcortical 

white matter hypodensities, which are nonspecific, but often seen 

with chronic microvascular ischemic disease. Please note that MRI 

with diffusion imaging is more sensitive in the detection of acute 

ischemic event.



VENTRICLES:

No hydrocephalus. 



CALVARIUM:

Unremarkable.



PARANASAL SINUSES:

Unremarkable as visualized. No significant inflammatory changes.



MASTOID AIR CELLS:

Unremarkable as visualized. No inflammatory changes.



OTHER FINDINGS:

None.



IMPRESSION:

Generalized atrophy.  Nonspecific white matter changes.

## 2017-12-28 NOTE — RAD
HISTORY:

SOB, upper back pain  



COMPARISON:

Chest x-ray performed 7/2/17 



TECHNIQUE:

Chest, one view.



FINDINGS:

Examination limited by habitus.



LUNGS:

Mild pulmonary venous congestion. Right hilar prominence. 



Please note that chest x-ray has limited sensitivity for the 

detection of pulmonary masses.



PLEURA:

No significant pleural effusion identified. No definite pneumothorax .



CARDIOVASCULAR:

Heart size appears top normal.  Atherosclerotic calcifications of the 

aorta.



OSSEOUS STRUCTURES:

 Degenerative changes.



VISUALIZED UPPER ABDOMEN:

Unremarkable.



OTHER FINDINGS:

None.



IMPRESSION:

Mild pulmonary venous congestion. Right hilar prominence.

## 2017-12-28 NOTE — C.PDOC
History Of Present Illness


76 y/o female  hx cardiac A-Fib presents to the ED c/o upper back pain and  

SOB. The patient denies fever, cough,nausea, and headache.


Chief Complaint (Nursing): Back Pain


History Per: Patient


History/Exam Limitations: no limitations


Onset/Duration Of Symptoms: Hrs


Current Symptoms Are (Timing): Still Present


Additional History Per: Patient





Past Medical History


Reviewed: Historical Data, Nursing Documentation, Vital Signs


Vital Signs: 


 Last Vital Signs











Temp  97.5 F L  12/28/17 16:36


 


Pulse  102 H  12/28/17 16:36


 


Resp  18   12/28/17 16:36


 


BP  121/74   12/28/17 16:36


 


Pulse Ox  96   12/28/17 16:36














- Medical History


PMH: Atrial Fibrillation, Cardia Arrhythmia (unknown tachycardia), HTN, 

Pneumonia


   Denies: Chronic Kidney Disease


Surgical History: No Surg Hx


Family History: States: No Known Family Hx





- Social History


Hx Alcohol Use: No


Hx Substance Use: No





- Immunization History


Hx Tetanus Toxoid Vaccination: No


Hx Influenza Vaccination: No


Hx Pneumococcal Vaccination: No





Review Of Systems


Except As Marked, All Systems Reviewed And Found Negative.


Constitutional: Negative for: Fever, Chills


Cardiovascular: Negative for: Chest Pain


Respiratory: Positive for: Shortness of Breath.  Negative for: Cough


Gastrointestinal: Negative for: Nausea, Vomiting


Musculoskeletal: Positive for: Back Pain (upper )


Skin: Negative for: Bruising





Physical Exam





- Physical Exam


Appears: Non-toxic, No Acute Distress


Skin: Warm, Dry


Head: Atraumatic


Eye(s): bilateral: PERRL


Oral Mucosa: Moist


Neck: Supple


Chest: Symmetrical


Cardiovascular: Rhythm Irregular


Respiratory: Normal Breath Sounds, No Rales, No Rhonchi


Gastrointestinal/Abdominal: Soft, No Tenderness, No Guarding, No Rebound


Extremity: Capillary Refill (2<sec. )


Neurological/Psych: Oriented x3, Normal Speech, Normal Cognition





ED Course And Treatment





- Laboratory Results


Result Diagrams: 


 12/28/17 13:30





 12/28/17 14:24


ECG Rhythm: Atrial Fibrillation (rapid ventricular response  and heart rate 113 

bpm )


O2 Sat by Pulse Oximetry: 97 (RA)


Progress Note: Plan- Blood work and admission





Medical Decision Making


Medical Decision Making: 


HISTORY:


SOB, upper back pain  





COMPARISON:


Chest x-ray performed 7/2/17 





TECHNIQUE:


Chest, one view.





FINDINGS:


Examination limited by habitus.





LUNGS:


Mild pulmonary venous congestion. Right hilar prominence. 





Please note that chest x-ray has limited sensitivity for the detection of 

pulmonary masses.





PLEURA:


No significant pleural effusion identified. No definite pneumothorax .





CARDIOVASCULAR:


Heart size appears top normal.  Atherosclerotic calcifications of the aorta.





OSSEOUS STRUCTURES:


 Degenerative changes.





VISUALIZED UPPER ABDOMEN:


Unremarkable.





OTHER FINDINGS:


None.





IMPRESSION:


Mild pulmonary venous congestion. Right hilar prominence.











Disposition





- Disposition


Disposition: HOSPITALIZED


Disposition Time: 17:43


Condition: FAIR


Forms:  [facility] (English)





- Clinical Impression


Clinical Impression: 


 Upper back pain, Dyspnea, A-fib








- PA / NP / Resident Statement


MD/DO has examined the patient and agrees with the treatment plan.





- Scribe Statement


The provider has reviewed the documentation as recorded by the Scribe


Carmelita Zavala

## 2017-12-28 NOTE — RAD
PROCEDURE:  Radiographs of the Right Shoulder



HISTORY:

pain







COMPARISON:

No prior.



FINDINGS:



BONES:

. No fracture.



JOINTS:

. Glenohumeral and acromioclavicular mild osteoarthritis. Cervical 

apophyseal joint hypertrophic arthrosis 



SOFT TISSUES:

Normal.



OTHER FINDINGS:

None.



IMPRESSION:

No fracture. Right shoulder arthrosis.  Cervical arthrosis

## 2017-12-29 VITALS
OXYGEN SATURATION: 66 % | RESPIRATION RATE: 20 BRPM | SYSTOLIC BLOOD PRESSURE: 106 MMHG | DIASTOLIC BLOOD PRESSURE: 68 MMHG | HEART RATE: 79 BPM | TEMPERATURE: 97.5 F

## 2017-12-29 LAB
ALBUMIN/GLOB SERPL: 1.2 {RATIO} (ref 1–2.1)
ALP SERPL-CCNC: 63 U/L (ref 38–126)
ALT SERPL-CCNC: 41 U/L (ref 9–52)
AST SERPL-CCNC: 34 U/L (ref 14–36)
BASOPHILS # BLD AUTO: 0 K/UL (ref 0–0.2)
BASOPHILS NFR BLD: 0.3 % (ref 0–2)
BILIRUB SERPL-MCNC: 0.5 MG/DL (ref 0.2–1.3)
BUN SERPL-MCNC: 20 MG/DL (ref 7–17)
CALCIUM SERPL-MCNC: 8.2 MG/DL (ref 8.6–10.4)
CHLORIDE SERPL-SCNC: 103 MMOL/L (ref 98–107)
CO2 SERPL-SCNC: 26 MMOL/L (ref 22–30)
EOSINOPHIL # BLD AUTO: 0.3 K/UL (ref 0–0.7)
EOSINOPHIL NFR BLD: 4.1 % (ref 0–4)
ERYTHROCYTE [DISTWIDTH] IN BLOOD BY AUTOMATED COUNT: 14.7 % (ref 11.5–14.5)
GLOBULIN SER-MCNC: 3.3 GM/DL (ref 2.2–3.9)
GLUCOSE SERPL-MCNC: 112 MG/DL (ref 65–105)
HCT VFR BLD CALC: 42.3 % (ref 34–47)
LYMPHOCYTES # BLD AUTO: 2.2 K/UL (ref 1–4.3)
LYMPHOCYTES NFR BLD AUTO: 29.5 % (ref 20–40)
MCH RBC QN AUTO: 31.7 PG (ref 27–31)
MCHC RBC AUTO-ENTMCNC: 34 G/DL (ref 33–37)
MCV RBC AUTO: 93.3 FL (ref 81–99)
MONOCYTES # BLD: 0.8 K/UL (ref 0–0.8)
MONOCYTES NFR BLD: 10.8 % (ref 0–10)
NRBC BLD AUTO-RTO: 0.1 % (ref 0–2)
PLATELET # BLD: 280 K/UL (ref 130–400)
PMV BLD AUTO: 9.2 FL (ref 7.2–11.7)
POTASSIUM SERPL-SCNC: 4.1 MMOL/L (ref 3.6–5.2)
PROT SERPL-MCNC: 7.3 G/DL (ref 6.3–8.3)
SODIUM SERPL-SCNC: 138 MMOL/L (ref 132–148)
TSH SERPL-ACNC: 2.38 MIU/L (ref 0.46–4.68)
WBC # BLD AUTO: 7.6 K/UL (ref 4.8–10.8)

## 2017-12-29 NOTE — CP.PCM.DIS
<Giancarlo Floyd - Last Filed: 12/29/17 13:40>





Provider





- Provider


Date of Admission: 


12/28/17 17:42





Attending physician: 


Brijesh Panda MD





Time Spent in preparation of Discharge (in minutes): 32





Diagnosis





- Discharge Diagnosis


(1) Paroxysmal atrial fibrillation with rapid ventricular response


Status: Acute   





(2) Hypothyroidism


Status: Chronic   Priority: High   





Hospital Course





- Lab Results


Lab Results: 


 Most Recent Lab Values











WBC  7.6 K/uL (4.8-10.8)   12/29/17  07:01    


 


RBC  4.53 Mil/uL (3.80-5.20)   12/29/17  07:01    


 


Hgb  14.4 g/dL (11.0-16.0)   12/29/17  07:01    


 


Hct  42.3 % (34.0-47.0)   12/29/17  07:01    


 


MCV  93.3 fL (81.0-99.0)   12/29/17  07:01    


 


MCH  31.7 pg (27.0-31.0)  H  12/29/17  07:01    


 


MCHC  34.0 g/dL (33.0-37.0)   12/29/17  07:01    


 


RDW  14.7 % (11.5-14.5)  H  12/29/17  07:01    


 


Plt Count  280 K/uL (130-400)   12/29/17  07:01    


 


MPV  9.2 fL (7.2-11.7)   12/29/17  07:01    


 


Neut % (Auto)  55.3 % (50.0-75.0)   12/29/17  07:01    


 


Lymph % (Auto)  29.5 % (20.0-40.0)   12/29/17  07:01    


 


Mono % (Auto)  10.8 % (0.0-10.0)  H  12/29/17  07:01    


 


Eos % (Auto)  4.1 % (0.0-4.0)  H  12/29/17  07:01    


 


Baso % (Auto)  0.3 % (0.0-2.0)   12/29/17  07:01    


 


Neut #  4.2 K/uL (1.8-7.0)   12/29/17  07:01    


 


Lymph #  2.2 K/uL (1.0-4.3)   12/29/17  07:01    


 


Mono #  0.8 K/uL (0.0-0.8)   12/29/17  07:01    


 


Eos #  0.3 K/uL (0.0-0.7)   12/29/17  07:01    


 


Baso #  0.0 K/uL (0.0-0.2)   12/29/17  07:01    


 


PT  18.5 SECONDS (9.7-12.2)  H  12/28/17  13:30    


 


INR  1.6   12/28/17  13:30    


 


APTT  40 SECONDS (21-34)  H  12/28/17  13:30    


 


Sodium  138 mmol/L (132-148)   12/29/17  07:01    


 


Potassium  4.1 mmol/L (3.6-5.2)   12/29/17  07:01    


 


Chloride  103 mmol/L ()   12/29/17  07:01    


 


Carbon Dioxide  26 mmol/L (22-30)   12/29/17  07:01    


 


Anion Gap  14  (10-20)   12/29/17  07:01    


 


BUN  20 mg/dL (7-17)  H  12/29/17  07:01    


 


Creatinine  0.8 mg/dL (0.7-1.2)   12/29/17  07:01    


 


Est GFR ( Amer)  > 60   12/29/17  07:01    


 


Est GFR (Non-Af Amer)  > 60   12/29/17  07:01    


 


Random Glucose  112 mg/dL ()  H  12/29/17  07:01    


 


Calcium  8.2 mg/dl (8.6-10.4)  L  12/29/17  07:01    


 


Total Bilirubin  0.5 mg/dL (0.2-1.3)   12/29/17  07:01    


 


AST  34 U/L (14-36)   12/29/17  07:01    


 


ALT  41 U/L (9-52)   12/29/17  07:01    


 


Alkaline Phosphatase  63 U/L ()   12/29/17  07:01    


 


Total Creatine Kinase  32 U/L ()   12/28/17  14:24    


 


CK-MB (Mass)  < 0.22 ng/mL (0.0-3.38)   12/28/17  14:24    


 


Troponin I  < 0.0120 ng/mL (0.00-0.120)   12/28/17  14:24    


 


Total Protein  7.3 g/dL (6.3-8.3)   12/29/17  07:01    


 


Albumin  4.0 g/dL (3.5-5.0)   12/29/17  07:01    


 


Globulin  3.3 gm/dL (2.2-3.9)   12/29/17  07:01    


 


Albumin/Globulin Ratio  1.2  (1.0-2.1)   12/29/17  07:01    


 


TSH 3rd Generation  2.38 mIU/L (0.46-4.68)   12/29/17  07:01    














- Hospital Course


Hospital Course: 





Initial Note:


"Patient is a 77  female w/ PMH of Afib/HTN. She was diagnosed with 

[place] when she had pneumonia. At that 

time she was put on metoprolol and xarelto. Patient then came [place] the [place] where 

she goes see a doctor in a clinic where they monitor her condition. She does 

not know the name of the doctor nor does she know if she sees a cardiologist. 

On presentation to the ED her HR was elevated @ 118. Cardizem was given and her 

HR was brought down to 102. She has been complaining of palpations as well. Her 

new symptoms of weaknes and pain began earlier today. Nothing made it better or 

worse, but it has resolved upon my interview. She describes the pain as 

weakness and "pain" traveling down the R. arm. She denies chest pain or SOB. 

She sleeps with one pillow at night. She ambulates independently. Denies fever 

or chills. Denies blurry vision, syncope, changes in speech or mentation. 

Denies any cough."





Hospital Course:


Patient admitted for observation for atrial fibrillation with rapid ventricular 

response. She was given IV Cardizem in the ED which improved her heart rate. 

Patient had been complaining of right shoulder pain. This could have likely 

contributed to her elevated pulse. There was concern of thyroid issues causing 

the elevated heart rate but TSH was unremarkable. Per patient's daughter, she 

is on Caridizem and Metoprolol once daily at home. Given that the patient has a 

relatively low blood pressure, the decision was made not to increase her dosing 

of the aforementioned medications. Instead, the patient was discharged on 0.25 

mg of digoxin to better control her rate and told to continue her home 

medication regimen until she is able to follow up with her primary physician at 

the [facility].





This is a summary of the hospital course. For more information, please refer to 

the medical records.





Discharge Exam





- Head Exam


Head Exam: ATRAUMATIC, NORMAL INSPECTION, NORMOCEPHALIC





- Eye Exam


Eye Exam: EOMI, Normal appearance





- ENT Exam


ENT Exam: Mucous Membranes Moist





- Respiratory Exam


Respiratory Exam: Clear to PA & Lateral.  absent: Rales, Rhonchi, Wheezes





- Cardiovascular Exam


Cardiovascular Exam: Irregular Rhythm, +S1, +S2.  absent: Tachycardia





- GI/Abdominal Exam


GI & Abdominal Exam: Normal Bowel Sounds, Soft.  absent: Tenderness





- Extremities Exam


Extremities exam: pedal pulses present





- Neurological Exam


Neurological exam: Alert, CN II-XII Intact, Oriented x3





- Psychiatric Exam


Psychiatric exam: Normal Affect, Normal Mood





- Skin


Skin Exam: Dry, Intact, Normal Color, Warm





Discharge Plan





- Discharge Medications


Prescriptions: 


Digoxin [Lanoxin] 0.25 mg PO DAILY #30 tablet





- Follow Up Plan


Condition: STABLE


Disposition: HOME/ ROUTINE


Instructions:  Digoxin (By mouth), Atrial Fibrillation (DC), Dyspnea (GEN), 

Back Pain (GEN)


Additional Instructions: 


Please continue the Metoprolol, Cardizem, Xarelto, Levothyroxine once a day as 

prescribed by your doctor at the [facility].


Please also start Digoxin 0.25 mg once a day for the heart.


Please follow up at the [facility].


If there are any new or worsening symptoms, please return to the nearest 

emergency room.








Contine con Metoprolol, Cardizem, Xarelto, Levothyroxine maggi vez al da segn 

lo prescrito por harris mdico en [facility].


Comience tambin con Digoxin 0.25 mg maggi vez al da para el corazn.


Por favor, siga en [facility].


Si hay sntomas nuevos o que empeoran, regrese a la geraldo de emergencias ms 

luiza.


Referrals: 


[facility] [Outside]





<Jorge Kinsey - Last Filed: 12/29/17 14:12>





Provider





- Provider


Date of Admission: 


12/28/17 17:42





Attending physician: 


Brijesh Panda MD








Hospital Course





- Lab Results


Lab Results: 


 Most Recent Lab Values











WBC  7.6 K/uL (4.8-10.8)   12/29/17  07:01    


 


RBC  4.53 Mil/uL (3.80-5.20)   12/29/17  07:01    


 


Hgb  14.4 g/dL (11.0-16.0)   12/29/17  07:01    


 


Hct  42.3 % (34.0-47.0)   12/29/17  07:01    


 


MCV  93.3 fL (81.0-99.0)   12/29/17  07:01    


 


MCH  31.7 pg (27.0-31.0)  H  12/29/17  07:01    


 


MCHC  34.0 g/dL (33.0-37.0)   12/29/17  07:01    


 


RDW  14.7 % (11.5-14.5)  H  12/29/17  07:01    


 


Plt Count  280 K/uL (130-400)   12/29/17  07:01    


 


MPV  9.2 fL (7.2-11.7)   12/29/17  07:01    


 


Neut % (Auto)  55.3 % (50.0-75.0)   12/29/17  07:01    


 


Lymph % (Auto)  29.5 % (20.0-40.0)   12/29/17  07:01    


 


Mono % (Auto)  10.8 % (0.0-10.0)  H  12/29/17  07:01    


 


Eos % (Auto)  4.1 % (0.0-4.0)  H  12/29/17  07:01    


 


Baso % (Auto)  0.3 % (0.0-2.0)   12/29/17  07:01    


 


Neut #  4.2 K/uL (1.8-7.0)   12/29/17  07:01    


 


Lymph #  2.2 K/uL (1.0-4.3)   12/29/17  07:01    


 


Mono #  0.8 K/uL (0.0-0.8)   12/29/17  07:01    


 


Eos #  0.3 K/uL (0.0-0.7)   12/29/17  07:01    


 


Baso #  0.0 K/uL (0.0-0.2)   12/29/17  07:01    


 


PT  18.5 SECONDS (9.7-12.2)  H  12/28/17  13:30    


 


INR  1.6   12/28/17  13:30    


 


APTT  40 SECONDS (21-34)  H  12/28/17  13:30    


 


Sodium  138 mmol/L (132-148)   12/29/17  07:01    


 


Potassium  4.1 mmol/L (3.6-5.2)   12/29/17  07:01    


 


Chloride  103 mmol/L ()   12/29/17  07:01    


 


Carbon Dioxide  26 mmol/L (22-30)   12/29/17  07:01    


 


Anion Gap  14  (10-20)   12/29/17  07:01    


 


BUN  20 mg/dL (7-17)  H  12/29/17  07:01    


 


Creatinine  0.8 mg/dL (0.7-1.2)   12/29/17  07:01    


 


Est GFR ( Amer)  > 60   12/29/17  07:01    


 


Est GFR (Non-Af Amer)  > 60   12/29/17  07:01    


 


Random Glucose  112 mg/dL ()  H  12/29/17  07:01    


 


Calcium  8.2 mg/dl (8.6-10.4)  L  12/29/17  07:01    


 


Total Bilirubin  0.5 mg/dL (0.2-1.3)   12/29/17  07:01    


 


AST  34 U/L (14-36)   12/29/17  07:01    


 


ALT  41 U/L (9-52)   12/29/17  07:01    


 


Alkaline Phosphatase  63 U/L ()   12/29/17  07:01    


 


Total Creatine Kinase  32 U/L ()   12/28/17  14:24    


 


CK-MB (Mass)  < 0.22 ng/mL (0.0-3.38)   12/28/17  14:24    


 


Troponin I  < 0.0120 ng/mL (0.00-0.120)   12/28/17  14:24    


 


Total Protein  7.3 g/dL (6.3-8.3)   12/29/17  07:01    


 


Albumin  4.0 g/dL (3.5-5.0)   12/29/17  07:01    


 


Globulin  3.3 gm/dL (2.2-3.9)   12/29/17  07:01    


 


Albumin/Globulin Ratio  1.2  (1.0-2.1)   12/29/17  07:01    


 


TSH 3rd Generation  2.38 mIU/L (0.46-4.68)   12/29/17  07:01    














Attending/Attestation





- Attestation


I have personally seen and examined this patient.: Yes


I have fully participated in the care of the patient.: Yes


I have reviewed all pertinent clinical information, including history, physical 

exam and plan: Yes


Notes (Text): 





12/29/17 14:03


Medical attending:


Patient was seen and examined by me, agrees the above note by the medical 

resident.





Family members were present in the room as well, the patient was okay with this 

family members participated during our discussion.





As mentioned above in the resident note the patient has a history of atrial 

fibrillation that was found in her [place]. She 

has been taking Xarelto for anticoagulation as well as oral Cardizem and oral 

metoprolol succinate for rate control. She's been doing pretty well according 

to the family however recently she is noticing palpitations. The patient To the 

emergency room last night she was reporting significant amount of right-sided 

shoulder pain. 





However by the time we saw her in the morning the pain had resolved. Review for 

telemetry shows that her heart rate varies anywhere between 110s to 120 range 

overnight





The patient is a 70 has relatively low blood pressure. She is able to walk 

around with us in the hallway and then back to her bed. The patient will be 

discharged, with the family members we explained to them the need to continue 

metoprolol succinate as well as the Cardizem and Xarelto. The new medication be 

digoxin orally for further rate control





Thank you very much,


Jorge Kinsey

## 2018-01-14 ENCOUNTER — HOSPITAL ENCOUNTER (EMERGENCY)
Dept: HOSPITAL 31 - C.ER | Age: 78
LOS: 1 days | Discharge: HOME | End: 2018-01-15
Payer: SELF-PAY

## 2018-01-14 VITALS — RESPIRATION RATE: 20 BRPM

## 2018-01-14 VITALS — HEART RATE: 116 BPM | BODY MASS INDEX: 33.3 KG/M2

## 2018-01-14 DIAGNOSIS — Z79.01: ICD-10-CM

## 2018-01-14 DIAGNOSIS — J20.8: ICD-10-CM

## 2018-01-14 DIAGNOSIS — I50.9: ICD-10-CM

## 2018-01-14 DIAGNOSIS — Z87.01: ICD-10-CM

## 2018-01-14 DIAGNOSIS — I11.0: ICD-10-CM

## 2018-01-14 DIAGNOSIS — I48.0: Primary | ICD-10-CM

## 2018-01-14 DIAGNOSIS — Z79.899: ICD-10-CM

## 2018-01-14 LAB
ALBUMIN SERPL-MCNC: 3.9 G/DL (ref 3.5–5)
ALBUMIN/GLOB SERPL: 1.2 {RATIO} (ref 1–2.1)
ALT SERPL-CCNC: 38 U/L (ref 9–52)
AST SERPL-CCNC: 23 U/L (ref 14–36)
BASOPHILS # BLD AUTO: 0.1 K/UL (ref 0–0.2)
BASOPHILS NFR BLD: 1.1 % (ref 0–2)
BNP SERPL-MCNC: 2440 PG/ML (ref 0–900)
BUN SERPL-MCNC: 24 MG/DL (ref 7–17)
CALCIUM SERPL-MCNC: 9 MG/DL (ref 8.6–10.4)
EOSINOPHIL # BLD AUTO: 0.3 K/UL (ref 0–0.7)
EOSINOPHIL NFR BLD: 4.7 % (ref 0–4)
ERYTHROCYTE [DISTWIDTH] IN BLOOD BY AUTOMATED COUNT: 14.9 % (ref 11.5–14.5)
GFR NON-AFRICAN AMERICAN: > 60
HGB BLD-MCNC: 13.3 G/DL (ref 11–16)
LYMPHOCYTES # BLD AUTO: 1.3 K/UL (ref 1–4.3)
LYMPHOCYTES NFR BLD AUTO: 21.8 % (ref 20–40)
MCH RBC QN AUTO: 31.2 PG (ref 27–31)
MCHC RBC AUTO-ENTMCNC: 33.2 G/DL (ref 33–37)
MCV RBC AUTO: 93.8 FL (ref 81–99)
MONOCYTES # BLD: 0.5 K/UL (ref 0–0.8)
MONOCYTES NFR BLD: 8.8 % (ref 0–10)
NEUTROPHILS # BLD: 3.9 K/UL (ref 1.8–7)
NEUTROPHILS NFR BLD AUTO: 63.6 % (ref 50–75)
NRBC BLD AUTO-RTO: 0.1 % (ref 0–2)
PLATELET # BLD: 236 K/UL (ref 130–400)
PMV BLD AUTO: 9 FL (ref 7.2–11.7)
RBC # BLD AUTO: 4.25 MIL/UL (ref 3.8–5.2)
WBC # BLD AUTO: 6.1 K/UL (ref 4.8–10.8)

## 2018-01-14 PROCEDURE — 96374 THER/PROPH/DIAG INJ IV PUSH: CPT

## 2018-01-14 PROCEDURE — 96375 TX/PRO/DX INJ NEW DRUG ADDON: CPT

## 2018-01-14 PROCEDURE — 80053 COMPREHEN METABOLIC PANEL: CPT

## 2018-01-14 PROCEDURE — 85025 COMPLETE CBC W/AUTO DIFF WBC: CPT

## 2018-01-14 PROCEDURE — 71045 X-RAY EXAM CHEST 1 VIEW: CPT

## 2018-01-14 PROCEDURE — 99285 EMERGENCY DEPT VISIT HI MDM: CPT

## 2018-01-14 PROCEDURE — 85378 FIBRIN DEGRADE SEMIQUANT: CPT

## 2018-01-14 PROCEDURE — 83880 ASSAY OF NATRIURETIC PEPTIDE: CPT

## 2018-01-14 PROCEDURE — 84484 ASSAY OF TROPONIN QUANT: CPT

## 2018-01-14 PROCEDURE — 93005 ELECTROCARDIOGRAM TRACING: CPT

## 2018-01-14 PROCEDURE — 71275 CT ANGIOGRAPHY CHEST: CPT

## 2018-01-14 NOTE — C.PDOC
History Of Present Illness


Patient is a 76 y/o female, with a Hx of AFib, PNA, and HTN, who presents to 

the ED with complaints of SOB, congestion, and cough for the last 5 days. 

Patient notes back pain and generalized weakness; denies CP, fever, sweating, 

nausea/vomiting, abdominal pain, or Hx of CHF, COPD, emphysema, DM, or 

bronchitis. Admits SOB is continuous from congestion. No other physical 

complaints at this time. 


Time Seen by Provider: 18 19:49


Chief Complaint (Nursing): Shortness Of Breath


History Per: Patient, Family (relative)


History/Exam Limitations: no limitations


Onset/Duration Of Symptoms: Days (5 days), Persistent


Current Symptoms Are (Timing): Still Present


Exacerbating Factor(s): Laying Flat (relieved when sitting up )


Current Respiratory Medications: See Home Med List


Associated Symptoms: denies: Fever


Recent travel outside of the [place]: No





Past Medical History


Reviewed: Historical Data, Nursing Documentation, Vital Signs


Vital Signs: 


 Last Vital Signs











Temp  98.7 F   01/15/18 02:00


 


Pulse  107 H  01/15/18 02:00


 


Resp  20   01/15/18 02:00


 


BP  135/78   01/15/18 02:00


 


Pulse Ox  95   01/15/18 02:00














- Medical History


PMH: Atrial Fibrillation, Cardia Arrhythmia, HTN, Pneumonia


   Denies: Chronic Kidney Disease


Surgical History: No Surg Hx


Family History: States: Unknown Family Hx





- Social History


Hx Alcohol Use: No


Hx Substance Use: No





- Immunization History


Hx Tetanus Toxoid Vaccination: No


Hx Influenza Vaccination: Yes


Hx Pneumococcal Vaccination: Yes





Review Of Systems


Constitutional: Positive for: Weakness (generalized).  Negative for: Fever, 

Sweats


Cardiovascular: Negative for: Chest Pain


Respiratory: Positive for: Cough, Shortness of Breath, Wheezing


Gastrointestinal: Negative for: Nausea, Vomiting, Abdominal Pain


Musculoskeletal: Positive for: Back Pain





Physical Exam





- Physical Exam


Appears: Well, Non-toxic, No Acute Distress


Skin: Normal Color, Warm, Dry


Head: Atraumatic, Normacephalic


Oral Mucosa: Moist


Chest: Symmetrical


Cardiovascular: No Rhythm Irregular (irregularly irregular), JVD (at 30 degrees 

head elevation), Other (accelerated rate)


Respiratory: Normal Breath Sounds, No Rales, No Rhonchi, No Wheezing


Gastrointestinal/Abdominal: Soft, No Tenderness


Extremity: Swelling (trace peripheral edema)





ED Course And Treatment





- Laboratory Results


Result Diagrams: 


 18 20:19





 18 20:19


ECG: Interpreted By Me, Viewed By Me


ECG Rhythm: Atrial Fibrillation, Nonspecific Changes


ECG Interpretation: Abnormal


Rate From EC (bpm)


O2 Sat by Pulse Oximetry: 97





- Radiology


CXR: Interpreted by Me, Viewed By Me


CXR Interpretation: Yes: Other (pulmonary vascular congestion consistent with 

CHF)





- CT Scan/US


  ** Angio


Other Rad Studies (CT/US): Interpreted By Me, Read By Radiologist


CT/US Interpretation: EXAM:  CT Angiography Chest With Intravenous Contrast.  

CLINICAL HISTORY:  77 years old, female; Signs and symptoms; Shortness of breath

; Additional info: SOB.  TECHNIQUE:  Axial computed tomographic angiography 

images of the chest with intravenous contrast using.  pulmonary embolism 

protocol. All CT scans at this facility use one or more dose reduction.  

techniques, viz.: automated exposure control; ma/kV adjustment per patient size 

(including targeted.  exams where dose is matched to indication; i.e. head); or 

iterative reconstruction technique.  MIP reconstructed images were created and 

reviewed.  Coronal and sagittal reformatted images were created and reviewed.  

CONTRAST:  100 mL of [id number] administered intravenously.  COMPARISON:  No 

relevant prior studies available.  FINDINGS:  Pulmonary arteries: No pulmonary 

embolism visualized. Artifact limits evaluation of segmental and.  subsegmental 

arteries.  Aorta: Atherosclerosis. No thoracic aortic aneurysm.  Lungs: 

Atelectasis. No mass. No consolidation.  Pleural space: No significant 

effusion. No pneumothorax.  Heart: No cardiomegaly. No significant pericardial 

effusion.  Bones: Degenerative changes.  Lymph nodes: Shotty nodes.  Small 

hiatal hernia.  IMPRESSION:  No definitive acute CT finding to correspond to 

reported history. Please see details/findings as.  above.  Thank you for 

allowing us to participate in the care of your patient.





Medical Decision Making


Medical Decision Making: 





clinical impression: 


* possible heart failure due to ill-controlled AFib





plan: 


* cardizen 20 mg and lasix administered


* EKG, CXR, and blood work ordered


* patient to be admitted to floor telemetry under Dr. Panda. 


* Pt evaluated in the ED by Dr Panda who feels that pt can be managed from 

home for her atrial fib and CHF.He discussed the discharge plan with pt and 

assumes responsibility for her discharge





Disposition





- Disposition


Disposition: HOME/ ROUTINE


Disposition Time: 21:26


Condition: FAIR





- Clinical Impression


Clinical Impression: 


 Paroxysmal atrial fibrillation with rapid ventricular response, Congestive 

cardiac failure








- Scribe Statement


The provider has reviewed the documentation as recorded by the Scribe





Humera Jefferson





All medical record entries made by the Scribe were at my direction and 

personally dictated by me. I have reviewed the chart and agree that the record 

accurately reflects my personal performance of the history, physical exam, 

medical decision making, and the department course for this patient. I have 

also personally directed, reviewed, and agree with the discharge instructions 

and disposition.

## 2018-01-14 NOTE — CP.PCM.HP
<Gini Joseph - Last Filed: 01/15/18 03:25>





History of Present Illness





- History of Present Illness


History of Present Illness: 





Medicine Note for Hospital Service 





CC: SOB, congestion, and cough for the last 5 days.





HPI: 78 yo  Female with PMHx of Atrial Fibrillation and HTN presents to 

the ED with SOB, congestion, and cough for the last 5 days. Daughter was at 

bedside and was providing history and translating for the patient. Patient 

reports he has felt feverish, productive cough with yellow/ green sputum, with 

congestion and SOB. She has tried OTC cough syrup with no relief. She admits to 

associated fatigue. Daughter admits to not giving digoxin for the past 3 days, 

due to HR being in the low 60s. Denied any sick contacts, received flu vaccine 

last year. Denied fever, chills, headache, SOB, chest pain, abdominal pain, n/v/

d/c, or urinary symptoms. 





PMHx: Atrial Fibrillation and HTN


PSHx: Denied


Meds: As per MAR, reviewed and confirmed


All: NKDA


SHx: Denied x3 


FHx: Sister - cancer 





PMD: Clinic 











Present on Admission





- Present on Admission


Any Indicators Present on Admission: No





Past Patient History





- Infectious Disease


Hx of Infectious Diseases: None





- Past Medical History & Family History


Past Medical History?: Yes





- Past Social History


Smoking Status: Never Smoked





- CARDIAC


Hx Atrial Fibrillation: Yes


Hx Cardia Arrhythmia: Yes


Hx Hypertension: Yes





- PULMONARY


Hx Pneumonia: Yes





- NEUROLOGICAL


Hx Neurological Disorder: No





- HEENT


Hx HEENT Problems: No





- RENAL


Hx Chronic Kidney Disease: No





- ENDOCRINE/METABOLIC


Hx Endocrine Disorders: No





- HEMATOLOGICAL/ONCOLOGICAL


Hx Blood Disorders: No





- INTEGUMENTARY


Hx Dermatological Problems: No





- MUSCULOSKELETAL/RHEUMATOLOGICAL


Hx Musculoskeletal Disorders: No





- GASTROINTESTINAL


Hx Gastrointestinal Disorders: No





- GENITOURINARY/GYNECOLOGICAL


Hx Genitourinary Disorders: No





- PSYCHIATRIC


Hx Substance Use: No





- SURGICAL HISTORY


Hx Surgeries: No





- ANESTHESIA


Hx Anesthesia: No





Meds


Home Medications: 


 Home Medication List











 Medication  Instructions  Recorded  Confirmed  Type


 


Digoxin 0.125 mg PO DAILY #30 tab 01/14/18  Rx


 


Doxycycline Monohydrate 100 mg PO Q12 #10 capsule 01/14/18  Rx


 


Metoprolol Tartrate [Lopressor] 50 mg PO BID #60 tab 01/14/18  Rx











Allergies/Adverse Reactions: 


 Allergies











Allergy/AdvReac Type Severity Reaction Status Date / Time


 


No Known Allergies Allergy   Verified 01/14/18 19:36














Physical Exam





- Constitutional


Appears: No Acute Distress





- Head Exam


Head Exam: NORMAL INSPECTION, NORMOCEPHALIC





- Eye Exam


Eye Exam: EOMI, Normal appearance, PERRL


Pupil Exam: NORMAL ACCOMODATION





- ENT Exam


ENT Exam: Mucous Membranes Moist, Normal Exam





- Respiratory Exam


Respiratory Exam: absent: Decreased Breath Sounds, Rales, Rhonchi, Wheezes





- Cardiovascular Exam


Cardiovascular Exam: REGULAR RHYTHM





- GI/Abdominal Exam


GI & Abdominal Exam: Normal Bowel Sounds, Soft.  absent: Distended, Tenderness





- Extremities Exam


Extremities exam: Positive for: normal inspection, pedal pulses present.  

Negative for: joint swelling, pedal edema, tenderness





- Back Exam


Back exam: NORMAL INSPECTION





- Neurological Exam


Neurological exam: Alert, CN II-XII Intact, Oriented x3





- Psychiatric Exam


Psychiatric exam: Normal Affect, Normal Mood





- Skin


Skin Exam: Dry, Intact, Normal Color, Warm





Results





- Vital Signs


Recent Vital Signs: 





 Last Vital Signs











Temp  98.6 F   01/14/18 19:31


 


Pulse  86   01/14/18 22:04


 


Resp  20   01/14/18 22:04


 


BP  120/73   01/14/18 22:04


 


Pulse Ox  95   01/14/18 22:04














- Labs


Result Diagrams: 


 01/14/18 20:19





 01/14/18 20:19


Labs: 





 Laboratory Results - last 24 hr











  01/14/18 01/14/18





  20:19 20:19


 


WBC  6.1 


 


RBC  4.25 


 


Hgb  13.3 


 


Hct  39.9 


 


MCV  93.8 


 


MCH  31.2 H 


 


MCHC  33.2 


 


RDW  14.9 H 


 


Plt Count  236 


 


MPV  9.0 


 


Neut % (Auto)  63.6 


 


Lymph % (Auto)  21.8 


 


Mono % (Auto)  8.8 


 


Eos % (Auto)  4.7 H 


 


Baso % (Auto)  1.1 


 


Neut #  3.9 


 


Lymph #  1.3 


 


Mono #  0.5 


 


Eos #  0.3 


 


Baso #  0.1 


 


Sodium   141


 


Potassium   4.0


 


Chloride   104


 


Carbon Dioxide   31 H


 


Anion Gap   11


 


BUN   24 H


 


Creatinine   0.9


 


Est GFR ( Amer)   > 60


 


Est GFR (Non-Af Amer)   > 60


 


Random Glucose   125 H


 


Calcium   9.0


 


Total Bilirubin   0.3


 


AST   23


 


ALT   38


 


Alkaline Phosphatase   63


 


Troponin I   < 0.0120


 


NT-Pro-B Natriuret Pep   2440 H


 


Total Protein   7.1


 


Albumin   3.9


 


Globulin   3.3


 


Albumin/Globulin Ratio   1.2














Assessment & Plan





- Assessment and Plan (Free Text)


Plan: 





Bronchitis 


   Afebrile, tachycardic, no leukocytosis or left shift 


   CXR: Unremarkable 


   CTA Chest:  No definitive acute CT finding to correspond to reported 

history. 





   Discharged with Doxycycline 100mg PO Q12 x 5 days, instructed to take OTC 

Robitussin





Hx AFib


   Patient instructed to continue home medications, with the addition of 

decreasing the home dose of digoxin (0.25 to now 0.125mg PO daily). 





Hx HTN





Patient was discharged after being evaluated in the ED. 





DW Gini Cerda DO, PGY-1





<Brijesh Panda P - Last Filed: 01/15/18 08:25>





Results





- Vital Signs


Recent Vital Signs: 





 Last Vital Signs











Temp  98.7 F   01/15/18 02:00


 


Pulse  107 H  01/15/18 02:00


 


Resp  20   01/15/18 02:00


 


BP  135/78   01/15/18 02:00


 


Pulse Ox  97   01/15/18 05:02














- Labs


Result Diagrams: 


 01/14/18 20:19





 01/14/18 20:19


Labs: 





 Laboratory Results - last 24 hr











  01/14/18 01/14/18 01/14/18





  20:19 20:19 20:19


 


WBC  6.1  


 


RBC  4.25  


 


Hgb  13.3  


 


Hct  39.9  


 


MCV  93.8  


 


MCH  31.2 H  


 


MCHC  33.2  


 


RDW  14.9 H  


 


Plt Count  236  


 


MPV  9.0  


 


Neut % (Auto)  63.6  


 


Lymph % (Auto)  21.8  


 


Mono % (Auto)  8.8  


 


Eos % (Auto)  4.7 H  


 


Baso % (Auto)  1.1  


 


Neut #  3.9  


 


Lymph #  1.3  


 


Mono #  0.5  


 


Eos #  0.3  


 


Baso #  0.1  


 


D-Dimer, Quantitative   > 5250 H 


 


Sodium    141


 


Potassium    4.0


 


Chloride    104


 


Carbon Dioxide    31 H


 


Anion Gap    11


 


BUN    24 H


 


Creatinine    0.9


 


Est GFR ( Amer)    > 60


 


Est GFR (Non-Af Amer)    > 60


 


Random Glucose    125 H


 


Calcium    9.0


 


Total Bilirubin    0.3


 


AST    23


 


ALT    38


 


Alkaline Phosphatase    63


 


Troponin I    < 0.0120


 


NT-Pro-B Natriuret Pep    2440 H


 


Total Protein    7.1


 


Albumin    3.9


 


Globulin    3.3


 


Albumin/Globulin Ratio    1.2














Attending/Attestation





- Attestation


I have personally seen and examined this patient.: Yes


I have fully participated in the care of the patient.: Yes


I have reviewed all pertinent clinical information: Yes


Notes (Text): 





Patient presented with symptoms of cough, congestion, sore throat for 5 days, 

also daughter mentioned has not been giving digoxin 0.25mg daily as she was 

worried about hr slow down on her eval at home goes lowest down to mid 60's 

then up in 100. CXR was poor inspiratory effort, spo2 on ra was 94%, hr in 90-

100 range at time of eval. D/w daughter suspected symptoms as viral bronchitis 

ordered prophylactic doxycycline, robitussin dm, and continue home meds but 

reduce dig dose to 0.125mg and f/u with pmd. Also to mentioned CTA pe study 

ordered by ER after elevated ddimer didn't show pe in any big vessels, no pna. 

Patient is on eliquis for afib which she will continue.

## 2018-01-14 NOTE — CP.PCM.DIS
<Gini Joseph - Last Filed: 01/15/18 03:41>





Provider





- Provider


Date of Admission: 


01/14/18 21:25





Attending physician: 


Brijesh Panda MD





Time Spent in preparation of Discharge (in minutes): 55





Hospital Course





- Lab Results


Lab Results: 


 Most Recent Lab Values











WBC  6.1 K/uL (4.8-10.8)   01/14/18  20:19    


 


RBC  4.25 Mil/uL (3.80-5.20)   01/14/18  20:19    


 


Hgb  13.3 g/dL (11.0-16.0)   01/14/18  20:19    


 


Hct  39.9 % (34.0-47.0)   01/14/18  20:19    


 


MCV  93.8 fL (81.0-99.0)   01/14/18  20:19    


 


MCH  31.2 pg (27.0-31.0)  H  01/14/18  20:19    


 


MCHC  33.2 g/dL (33.0-37.0)   01/14/18  20:19    


 


RDW  14.9 % (11.5-14.5)  H  01/14/18  20:19    


 


Plt Count  236 K/uL (130-400)   01/14/18  20:19    


 


MPV  9.0 fL (7.2-11.7)   01/14/18  20:19    


 


Neut % (Auto)  63.6 % (50.0-75.0)   01/14/18  20:19    


 


Lymph % (Auto)  21.8 % (20.0-40.0)   01/14/18  20:19    


 


Mono % (Auto)  8.8 % (0.0-10.0)   01/14/18  20:19    


 


Eos % (Auto)  4.7 % (0.0-4.0)  H  01/14/18  20:19    


 


Baso % (Auto)  1.1 % (0.0-2.0)   01/14/18  20:19    


 


Neut #  3.9 K/uL (1.8-7.0)   01/14/18  20:19    


 


Lymph #  1.3 K/uL (1.0-4.3)   01/14/18  20:19    


 


Mono #  0.5 K/uL (0.0-0.8)   01/14/18  20:19    


 


Eos #  0.3 K/uL (0.0-0.7)   01/14/18  20:19    


 


Baso #  0.1 K/uL (0.0-0.2)   01/14/18  20:19    


 


D-Dimer, Quantitative  > 5250 ng/mlDDU (0-243)  H  01/14/18  20:19    


 


Sodium  141 mmol/L (132-148)   01/14/18  20:19    


 


Potassium  4.0 mmol/L (3.6-5.2)   01/14/18  20:19    


 


Chloride  104 mmol/L ()   01/14/18  20:19    


 


Carbon Dioxide  31 mmol/L (22-30)  H  01/14/18  20:19    


 


Anion Gap  11  (10-20)   01/14/18  20:19    


 


BUN  24 mg/dL (7-17)  H  01/14/18  20:19    


 


Creatinine  0.9 mg/dL (0.7-1.2)   01/14/18  20:19    


 


Est GFR ( Amer)  > 60   01/14/18  20:19    


 


Est GFR (Non-Af Amer)  > 60   01/14/18  20:19    


 


Random Glucose  125 mg/dL ()  H  01/14/18  20:19    


 


Calcium  9.0 mg/dl (8.6-10.4)   01/14/18  20:19    


 


Total Bilirubin  0.3 mg/dL (0.2-1.3)   01/14/18  20:19    


 


AST  23 U/L (14-36)   01/14/18  20:19    


 


ALT  38 U/L (9-52)   01/14/18  20:19    


 


Alkaline Phosphatase  63 U/L ()   01/14/18  20:19    


 


Troponin I  < 0.0120 ng/mL (0.00-0.120)   01/14/18  20:19    


 


NT-Pro-B Natriuret Pep  2440 pg/mL (0-900)  H  01/14/18  20:19    


 


Total Protein  7.1 g/dL (6.3-8.3)   01/14/18  20:19    


 


Albumin  3.9 g/dL (3.5-5.0)   01/14/18  20:19    


 


Globulin  3.3 gm/dL (2.2-3.9)   01/14/18  20:19    


 


Albumin/Globulin Ratio  1.2  (1.0-2.1)   01/14/18  20:19    














- Hospital Course


Hospital Course: 





Upon Admission:





CC: SOB, congestion, and cough for the last 5 days.





HPI: 78 yo  Female with PMHx of Atrial Fibrillation and HTN presents to 

the ED with SOB, congestion, and cough for the last 5 days. Daughter was at 

bedside and was providing history and translating for the patient. Patient 

reports he has felt feverish, productive cough with yellow/ green sputum, with 

congestion and SOB. She has tried OTC cough syrup with no relief. She admits to 

associated fatigue. Daughter admits to not giving digoxin for the past 3 days, 

due to HR being in the low 60s. Denied any sick contacts, received flu vaccine 

last year. Denied fever, chills, headache, SOB, chest pain, abdominal pain, n/v/

d/c, or urinary symptoms. 





PMHx: Atrial Fibrillation and HTN


PSHx: Denied


Meds: As per MAR, reviewed and confirmed


All: NKDA


SHx: Denied x3 


FHx: Sister - cancer 





PMD: Clinic 





Throughout Hospital Course:





Patient was admitted for concerns of atrial fib with RVR. After examining the 

patient, it was determined that she can be discharge. Discharged with 

Doxycycline 100mg PO Q12 x 5 days, instructed to take OTC Robitussin Patient 

instructed to continue home medications, with the addition of decreasing the 

home dose of digoxin (0.25 to now 0.125mg PO daily). She is to follow up with 

PMD in 1 week. 





This is a brief summary of the patient's hospital course, please review EMR for 

full record. 





Discharge Exam





- Head Exam


Head Exam: NORMAL INSPECTION, NORMOCEPHALIC





- Eye Exam


Eye Exam: EOMI, Normal appearance, PERRL


Pupil Exam: NORMAL ACCOMODATION





- ENT Exam


ENT Exam: Mucous Membranes Moist, Normal Exam





- Respiratory Exam


Respiratory Exam: Clear to PA & Lateral, NORMAL BREATHING PATTERN.  absent: 

Decreased Breath Sounds





- Cardiovascular Exam


Cardiovascular Exam: REGULAR RHYTHM





- GI/Abdominal Exam


GI & Abdominal Exam: Normal Bowel Sounds, Soft.  absent: Distended, Tenderness, 

Unremarkable





- Extremities Exam


Extremities exam: normal inspection, pedal pulses present





- Neurological Exam


Neurological exam: Alert, CN II-XII Intact, Oriented x3





- Psychiatric Exam


Psychiatric exam: Normal Affect, Normal Mood





- Skin


Skin Exam: Dry, Intact, Normal Color, Warm





Discharge Plan





- Discharge Medications


Prescriptions: 


Digoxin 0.125 mg PO DAILY #30 tab


Doxycycline Monohydrate 100 mg PO Q12 #10 capsule


Metoprolol Tartrate [Lopressor] 50 mg PO BID #60 tab





- Follow Up Plan


Condition: FAIR


Disposition: HOME/ ROUTINE


Additional Instructions: 


Patient is to continue home medications: Metoprolol, Cardizem, Xarelto, 

Levothyroxine and Digoxin 0.125mg by mouth daily (please cut the pill in half). 


Please also take Doxycycline 100mg by mouth twice a day x 5 days and Robitussin 

for her cough (this can be purchased over the counter).





Please follow up with primary care physican within 1 week. 





Please return to the emergency room if your symptoms worsen. 





-----------





El paciente debe continuar con los medicamentos para el hogar: metoprolol, 

Cardizem, Xarelto, levotiroxina y digoxina 0.125 mg por va oral diariamente (

por favor, jose la pldora por la mitad). 


Por favor, tambin tome doxiciclina 100mg por boca dos veces al da x 5 hunter y 

Robitussin para harris tos (Usted puede comprar esto sobre el pharmazy). 





Por favor, siga con mdico de atencin primaria en 1 semana. 





Por favor regrese a la geraldo de emergencias si love sntomas empeoran. 





<Brijesh Panda - Last Filed: 01/15/18 08:26>





Provider





- Provider


Date of Admission: 


01/14/18 21:25





Attending physician: 


Brijesh Panda MD








Hospital Course





- Lab Results


Lab Results: 


 Most Recent Lab Values











WBC  6.1 K/uL (4.8-10.8)   01/14/18  20:19    


 


RBC  4.25 Mil/uL (3.80-5.20)   01/14/18  20:19    


 


Hgb  13.3 g/dL (11.0-16.0)   01/14/18  20:19    


 


Hct  39.9 % (34.0-47.0)   01/14/18  20:19    


 


MCV  93.8 fL (81.0-99.0)   01/14/18  20:19    


 


MCH  31.2 pg (27.0-31.0)  H  01/14/18  20:19    


 


MCHC  33.2 g/dL (33.0-37.0)   01/14/18  20:19    


 


RDW  14.9 % (11.5-14.5)  H  01/14/18  20:19    


 


Plt Count  236 K/uL (130-400)   01/14/18  20:19    


 


MPV  9.0 fL (7.2-11.7)   01/14/18  20:19    


 


Neut % (Auto)  63.6 % (50.0-75.0)   01/14/18  20:19    


 


Lymph % (Auto)  21.8 % (20.0-40.0)   01/14/18  20:19    


 


Mono % (Auto)  8.8 % (0.0-10.0)   01/14/18  20:19    


 


Eos % (Auto)  4.7 % (0.0-4.0)  H  01/14/18  20:19    


 


Baso % (Auto)  1.1 % (0.0-2.0)   01/14/18  20:19    


 


Neut #  3.9 K/uL (1.8-7.0)   01/14/18  20:19    


 


Lymph #  1.3 K/uL (1.0-4.3)   01/14/18  20:19    


 


Mono #  0.5 K/uL (0.0-0.8)   01/14/18  20:19    


 


Eos #  0.3 K/uL (0.0-0.7)   01/14/18  20:19    


 


Baso #  0.1 K/uL (0.0-0.2)   01/14/18  20:19    


 


D-Dimer, Quantitative  > 5250 ng/mlDDU (0-243)  H  01/14/18  20:19    


 


Sodium  141 mmol/L (132-148)   01/14/18  20:19    


 


Potassium  4.0 mmol/L (3.6-5.2)   01/14/18  20:19    


 


Chloride  104 mmol/L ()   01/14/18  20:19    


 


Carbon Dioxide  31 mmol/L (22-30)  H  01/14/18  20:19    


 


Anion Gap  11  (10-20)   01/14/18  20:19    


 


BUN  24 mg/dL (7-17)  H  01/14/18  20:19    


 


Creatinine  0.9 mg/dL (0.7-1.2)   01/14/18  20:19    


 


Est GFR ( Amer)  > 60   01/14/18  20:19    


 


Est GFR (Non-Af Amer)  > 60   01/14/18  20:19    


 


Random Glucose  125 mg/dL ()  H  01/14/18  20:19    


 


Calcium  9.0 mg/dl (8.6-10.4)   01/14/18  20:19    


 


Total Bilirubin  0.3 mg/dL (0.2-1.3)   01/14/18  20:19    


 


AST  23 U/L (14-36)   01/14/18  20:19    


 


ALT  38 U/L (9-52)   01/14/18  20:19    


 


Alkaline Phosphatase  63 U/L ()   01/14/18  20:19    


 


Troponin I  < 0.0120 ng/mL (0.00-0.120)   01/14/18  20:19    


 


NT-Pro-B Natriuret Pep  2440 pg/mL (0-900)  H  01/14/18  20:19    


 


Total Protein  7.1 g/dL (6.3-8.3)   01/14/18  20:19    


 


Albumin  3.9 g/dL (3.5-5.0)   01/14/18  20:19    


 


Globulin  3.3 gm/dL (2.2-3.9)   01/14/18  20:19    


 


Albumin/Globulin Ratio  1.2  (1.0-2.1)   01/14/18  20:19    














Attending/Attestation





- Attestation


I have personally seen and examined this patient.: Yes


I have fully participated in the care of the patient.: Yes


I have reviewed all pertinent clinical information, including history, physical 

exam and plan: Yes


Notes (Text): 





01/15/18 08:25


Patient presented with symptoms of cough, congestion, sore throat for 5 days, 

also daughter mentioned has not been giving digoxin 0.25mg daily as she was 

worried about hr slow down on her eval at home goes lowest down to mid 60's 

then up in 100. CXR was poor inspiratory effort, spo2 on ra was 94%, hr in 90-

100 range at time of eval. D/w daughter suspected symptoms as viral bronchitis 

ordered prophylactic doxycycline, robitussin dm, and continue home meds but 

reduce dig dose to 0.125mg and f/u with pmd. Also to mentioned CTA pe study 

ordered by ER after elevated ddimer didn't show pe in any big vessels, no pna. 

Patient is on eliquis for afib which she will continue.

## 2018-01-15 VITALS — TEMPERATURE: 98.7 F | SYSTOLIC BLOOD PRESSURE: 135 MMHG | HEART RATE: 107 BPM | DIASTOLIC BLOOD PRESSURE: 78 MMHG

## 2018-01-15 VITALS — OXYGEN SATURATION: 97 %

## 2018-01-15 NOTE — CARD
--------------- APPROVED REPORT --------------





EKG Measurement

Heart [id number]

[id number]

[id number]

[id number]



<Conclusion>

Atrial fibrillation with rapid ventricular response with premature 

ventricular or aberrantly conducted complexes

Nonspecific T wave abnormality

Abnormal ECG

## 2018-01-15 NOTE — CT
EXAM:

  CT Angiography Chest With Intravenous Contrast



CLINICAL HISTORY:

  77 years old, female; Signs and symptoms; Shortness of breath; Additional 

info: SOB



TECHNIQUE:

  Axial computed tomographic angiography images of the chest with intravenous 

contrast using pulmonary embolism protocol.  All CT scans at this facility use 

one or more dose reduction techniques, viz.: automated exposure control; ma/kV 

adjustment per patient size (including targeted exams where dose is matched to 

indication; i.e. head); or iterative reconstruction technique.

  MIP reconstructed images were created and reviewed.

  Coronal and sagittal reformatted images were created and reviewed.



CONTRAST:

  100 mL of [id number] administered intravenously.



COMPARISON:

  No relevant prior studies available.



FINDINGS:

  Pulmonary arteries:  No pulmonary embolism visualized.  Artifact limits 

evaluation of segmental and subsegmental arteries.

  Aorta: Atherosclerosis.  No thoracic aortic aneurysm.

  Lungs: Atelectasis.  No mass.  No consolidation.

  Pleural space:  No significant effusion.  No pneumothorax.

  Heart:  No cardiomegaly.  No significant pericardial effusion.  

  Bones: Degenerative changes.

  Lymph nodes: Shotty nodes.

  Small hiatal hernia.



IMPRESSION:     

  No definitive acute CT finding to correspond to reported history.  Please see 

details/findings as above.

## 2018-01-15 NOTE — RAD
Chest x-ray single frontal view 



History: Shortness of breath. 



Comparison: None available. 



Findings: 



Moderate venous congestion.  Patchy bibasilar airspace opacities. 



Tortuous ectatic aorta. 



Right hilar prominence. 



Cardiomegaly. 



Degenerative changes in the spine with paravertebral osteophytes. 



Impression: 



Moderate venous congestion.  Patchy bibasilar airspace opacities. 



Tortuous ectatic aorta. 



Right hilar prominence. 



Cardiomegaly.

## 2018-04-07 ENCOUNTER — HOSPITAL ENCOUNTER (EMERGENCY)
Dept: HOSPITAL 31 - C.ER | Age: 78
Discharge: HOME | End: 2018-04-07
Payer: SELF-PAY

## 2018-04-07 VITALS — TEMPERATURE: 97.9 F

## 2018-04-07 VITALS — RESPIRATION RATE: 18 BRPM | HEART RATE: 84 BPM | SYSTOLIC BLOOD PRESSURE: 145 MMHG | DIASTOLIC BLOOD PRESSURE: 92 MMHG

## 2018-04-07 VITALS — HEART RATE: 116 BPM | BODY MASS INDEX: 33.3 KG/M2

## 2018-04-07 VITALS — OXYGEN SATURATION: 95 %

## 2018-04-07 DIAGNOSIS — R51: Primary | ICD-10-CM

## 2018-04-07 LAB
ALBUMIN SERPL-MCNC: 3.8 G/DL (ref 3.5–5)
ALBUMIN/GLOB SERPL: 1.1 {RATIO} (ref 1–2.1)
ALT SERPL-CCNC: 28 U/L (ref 9–52)
AST SERPL-CCNC: 29 U/L (ref 14–36)
BASOPHILS # BLD AUTO: 0.1 K/UL (ref 0–0.2)
BASOPHILS NFR BLD: 1.2 % (ref 0–2)
BUN SERPL-MCNC: 21 MG/DL (ref 7–17)
CALCIUM SERPL-MCNC: 9 MG/DL (ref 8.6–10.4)
EOSINOPHIL # BLD AUTO: 0.2 K/UL (ref 0–0.7)
EOSINOPHIL NFR BLD: 2.7 % (ref 0–4)
ERYTHROCYTE [DISTWIDTH] IN BLOOD BY AUTOMATED COUNT: 14.7 % (ref 11.5–14.5)
GFR NON-AFRICAN AMERICAN: > 60
HGB BLD-MCNC: 14.2 G/DL (ref 11–16)
LYMPHOCYTES # BLD AUTO: 1.5 K/UL (ref 1–4.3)
LYMPHOCYTES NFR BLD AUTO: 25.2 % (ref 20–40)
MCH RBC QN AUTO: 31 PG (ref 27–31)
MCHC RBC AUTO-ENTMCNC: 33.7 G/DL (ref 33–37)
MCV RBC AUTO: 92 FL (ref 81–99)
MONOCYTES # BLD: 0.6 K/UL (ref 0–0.8)
MONOCYTES NFR BLD: 9.8 % (ref 0–10)
NEUTROPHILS # BLD: 3.8 K/UL (ref 1.8–7)
NEUTROPHILS NFR BLD AUTO: 61.1 % (ref 50–75)
NRBC BLD AUTO-RTO: 0.1 % (ref 0–2)
PLATELET # BLD: 236 K/UL (ref 130–400)
PMV BLD AUTO: 8.4 FL (ref 7.2–11.7)
RBC # BLD AUTO: 4.57 MIL/UL (ref 3.8–5.2)
WBC # BLD AUTO: 6.1 K/UL (ref 4.8–10.8)

## 2018-04-07 PROCEDURE — 99285 EMERGENCY DEPT VISIT HI MDM: CPT

## 2018-04-07 PROCEDURE — 83735 ASSAY OF MAGNESIUM: CPT

## 2018-04-07 PROCEDURE — 96374 THER/PROPH/DIAG INJ IV PUSH: CPT

## 2018-04-07 PROCEDURE — 82948 REAGENT STRIP/BLOOD GLUCOSE: CPT

## 2018-04-07 PROCEDURE — 80162 ASSAY OF DIGOXIN TOTAL: CPT

## 2018-04-07 PROCEDURE — 70450 CT HEAD/BRAIN W/O DYE: CPT

## 2018-04-07 PROCEDURE — 80053 COMPREHEN METABOLIC PANEL: CPT

## 2018-04-07 PROCEDURE — 84484 ASSAY OF TROPONIN QUANT: CPT

## 2018-04-07 PROCEDURE — 85025 COMPLETE CBC W/AUTO DIFF WBC: CPT

## 2018-04-07 NOTE — C.PDOC
Time Seen by Provider: 18 18:17


Chief Complaint (Nursing): Headache


History Per: Patient, Family


Onset/Duration Of Symptoms: Days (1), Gradual


Current Symptoms Are (Timing): Still Present


Severity: Moderate


Quality: "Pain"


Associated Symptoms: Nausea.  denies: Photophobia, Blurred Vision, Vomiting, 

Extremity Weakness


Additional History Per: Prior Records





Past Medical History


Reviewed: Historical Data, Nursing Documentation, Vital Signs


Vital Signs: 





 Last Vital Signs











Temp  97.9 F   18 18:13


 


Pulse  92 H  18 19:56


 


Resp  20   18 19:56


 


BP  138/78   18 19:56


 


Pulse Ox  95   18 19:56














- Medical History


PMH: Atrial Fibrillation, Cardia Arrhythmia, HTN, Hypothyroidism, Pneumonia


Family History: States: Unknown Family Hx





- Social History


Hx Alcohol Use: No


Hx Substance Use: No





- Immunization History


Hx Tetanus Toxoid Vaccination: Yes


Hx Influenza Vaccination: Yes


Hx Pneumococcal Vaccination: No





Review Of Systems


Except As Marked, All Systems Reviewed And Found Negative.


Constitutional: Negative for: Fever, Weakness


Eyes: Negative for: Vision Change


Cardiovascular: Negative for: Chest Pain


Respiratory: Negative for: Shortness of Breath


Gastrointestinal: Negative for: Vomiting, Abdominal Pain


Skin: Negative for: Rash


Neurological: Positive for: Headache.  Negative for: Weakness, Numbness, 

Incoordination, Change in Speech, Confusion, Seizures, Altered Mental Status





Physical Exam





- Physical Exam


Appears: Non-toxic, No Acute Distress


Skin: Normal Color, Warm, Dry, No Rash


Head: Atraumatic, Normacephalic


Eye(s): bilateral: PERRL, EOMI


Neck: Normal ROM, Supple


Cardiovascular: Rhythm Regular


Respiratory: Normal Breath Sounds, No Accessory Muscle Use


Gastrointestinal/Abdominal: Soft, No Tenderness


Extremity: Normal ROM


Neurological/Psych: Oriented x3, Normal Speech, Normal Cognition, No Cerebellar 

Signs, Normal Motor, Normal Sensation





ED Course And Treatment





- Laboratory Results


Result Diagrams: 


 18 18:41





 18 18:41


Lab Interpretation: No Acute Changes


ECG: Interpreted By Me, Viewed By Me


ECG Rhythm: Atrial Fibrillation, Nonspecific Changes


ECG Interpretation: No Changes From Prior


Rate From EC


O2 Sat by Pulse Oximetry: 95


Pulse Ox Interpretation: Normal





- CT Scan/US


  ** CT head


Other Rad Studies (CT/US): Read By Radiologist, Radiology Report Reviewed


CT/US Interpretation: No evidence of acute intracranial abnormality.


Progress Note: Headache resolved.


Reassessment Condition: Improved





Progress





- Interventions


Interventions:: Observation





- Medications Administered


Intravenous: Antiemetic





- Data Reviewed


Data Reviewed: Lab, Diagnostic imaging, EKG, Old records





- Patient Status


Patient status: Completely improved





- Continuity of Care


Discussed patient case with:: Patient, Family-HIPPA compliant, ED Nurse





- Patient Plan


Patient Plan: Discharge, F/U with PCP, Continue present meds





Disposition


Counseled Patient/Family Regarding: Studies Performed, Diagnosis, Need For 

Followup





- Disposition


Referrals: 


[facility] [Outside]


Disposition: HOME/ ROUTINE


Disposition Time: 20:47


Condition: IMPROVED


Additional Instructions: 


Follow up with your doctor or in the clinic. Return to the ER if you develop 

weakness, numbness, vomiting, severe headache, worsening of symptoms or if you 

have any other concerns. 


Instructions:  Headache, Adult (DC)


Print Language: [place]





- Clinical Impression


Clinical Impression: 


 Headache

## 2018-04-08 NOTE — CT
PROCEDURE:  CT HEAD WITHOUT CONTRAST.



HISTORY:

Headache, on anticoagulation due to Afib.



COMPARISON:

None available. 



TECHNIQUE:

Axial computed tomography images were obtained through the head/brain 

without intravenous contrast.  



Radiation dose:



Total exam DLP = 818 mGy-cm.



This CT exam was performed using one or more of the following dose 

reduction techniques: Automated exposure control, adjustment of the 

mA and/or kV according to patient size, and/or use of iterative 

reconstruction technique.



FINDINGS:



HEMORRHAGE:

No intracranial hemorrhage. 



BRAIN:

Proportionate prominence of the sulci and ventricles compatible with 

age related involutional change. Scattered focal lucencies in the 

subcortical and periventricular white matter suggestive for chronic 

microvascular ischemic change. Focal hypodensity in the left basal 

ganglia suggestive for lacunar infarct. 



VENTRICLES:

Unremarkable. No hydrocephalus. 



CALVARIUM:

Unremarkable.



PARANASAL SINUSES:

Mucosal thickening in a right ethmoid air cell.



MASTOID AIR CELLS:

Unremarkable as visualized. No inflammatory changes.



OTHER FINDINGS:

Intracranial vascular calcifications.



IMPRESSION:

No acute intracranial abnormality.



Chronic microvascular ischemic changes.



Age related involutional changes. 



Small left basal ganglia lacunar infarct. 



Intracranial vascular calcifications.



Sinus mucosal disease. 



If symptoms persist, consider further evaluation with MRI. 



These findings were preliminarily reported at 8:39 p.m. on 04/07/2018 

by Dr. Yuri Matson from [facility].